# Patient Record
Sex: MALE | Race: WHITE | NOT HISPANIC OR LATINO | Employment: UNEMPLOYED | URBAN - METROPOLITAN AREA
[De-identification: names, ages, dates, MRNs, and addresses within clinical notes are randomized per-mention and may not be internally consistent; named-entity substitution may affect disease eponyms.]

---

## 2021-10-15 ENCOUNTER — HOSPITAL ENCOUNTER (EMERGENCY)
Facility: CLINIC | Age: 36
Discharge: HOME | End: 2021-10-15
Attending: EMERGENCY MEDICINE | Admitting: EMERGENCY MEDICINE

## 2021-10-15 VITALS
HEIGHT: 69 IN | SYSTOLIC BLOOD PRESSURE: 137 MMHG | WEIGHT: 194 LBS | TEMPERATURE: 98.9 F | HEART RATE: 77 BPM | BODY MASS INDEX: 28.73 KG/M2 | DIASTOLIC BLOOD PRESSURE: 96 MMHG | RESPIRATION RATE: 20 BRPM | OXYGEN SATURATION: 97 %

## 2021-10-15 DIAGNOSIS — R50.9 FEBRILE ILLNESS: ICD-10-CM

## 2021-10-15 PROCEDURE — 87631 RESP VIRUS 3-5 TARGETS: CPT | Performed by: EMERGENCY MEDICINE

## 2021-10-15 PROCEDURE — 84999 UNLISTED CHEMISTRY PROCEDURE: CPT | Performed by: EMERGENCY MEDICINE

## 2021-10-15 PROCEDURE — 99283 EMERGENCY DEPT VISIT LOW MDM: CPT | Performed by: EMERGENCY MEDICINE

## 2021-10-15 ASSESSMENT — MIFFLIN-ST. JEOR: SCORE: 1810.36

## 2021-10-15 NOTE — ED PROVIDER NOTES
"  History     Chief Complaint   Patient presents with     Generalized Body Aches     Fever     HPI  Marv Armendariz is a 36 year old male who presents with 1 day history of headache, body aches, abdominal pain, nausea, vomiting and diarrhea.  Had a temperature of 103 and had chills.  Denies chest pain, shortness of breath or cough.  No ear pain, runny nose or sore throat.  Was diagnosed with Covid 1 month ago and had 3 days of mild symptoms.  Concerned that he may have influenza.  Not received either Covid or influenza vaccines.  He was exposed through his children from school.  No treatment prior to arrival.  States he feels much better today and denies most of the symptoms he had experienced yesterday.  Patient states he has either Crohn's or colitis but has never had a formal diagnosis.  He had a normal colonoscopy recently.  Does not sound like he did biopsies.  He thinks he has inflammatory bowel disease as there is a strong family history and from his previous symptoms.  He thinks he is on the mild end of the spectrum for this problem.  No abdominal surgeries.  No history of diverticular disease.  No recent travel or antibiotic use.  No well water.    Allergies:  No Known Allergies    Problem List:    There are no problems to display for this patient.       Past Medical History:    No past medical history on file.    Past Surgical History:    No past surgical history on file.    Family History:    No family history on file.    Social History:  Marital Status:   [2]  Social History     Tobacco Use     Smoking status: Not on file   Substance Use Topics     Alcohol use: Not on file     Drug use: Not on file        Medications:    No current outpatient medications on file.        Review of Systems all other systems are reviewed and are negative.    Physical Exam   BP: (!) 137/96  Pulse: 95  Temp: 98.9  F (37.2  C)  Resp: 20  Height: 175.3 cm (5' 9\")  Weight: 88 kg (194 lb)  SpO2: 95 %      Physical " Exam General alert male does not toxic or ill.  HEENT shows ears to be clear bilaterally.  Eyes show no injection.  Nasal passages are boggy but patent.  Orally no lesions.  Neck is supple without meningismus.  Lungs are clear without adventitious sounds.  Normal cardiac auscultation.  Abdomen is soft and benign.  There is no organomegaly or masses.  No localizing tenderness.  No guarding or rebound.  No CVA tenderness.  No skin rashes are appreciated.    ED Course        Procedures              Critical Care time:  none               No results found for this or any previous visit (from the past 24 hour(s)).    Medications - No data to display  We not doing rapid testing for influenza at this point.  PCR test is ordered.  Assessments & Plan (with Medical Decision Making)   Marv Armendariz is a 36 year old male who presents with 1 day history of headache, body aches, abdominal pain, nausea, vomiting and diarrhea.  Had a temperature of 103 and had chills.  Denies chest pain, shortness of breath or cough.  No ear pain, runny nose or sore throat.  Was diagnosed with Covid 1 month ago and had 3 days of mild symptoms.  Concerned that he may have influenza.  Not received either Covid or influenza vaccines.  He was exposed through his children from school.  No treatment prior to arrival.  States he feels much better today and denies most of the symptoms he had experienced yesterday.  Patient states he has either Crohn's or colitis but has never had a formal diagnosis.  He had a normal colonoscopy recently.  Does not sound like he did biopsies.  He thinks he has inflammatory bowel disease as there is a strong family history and from his previous symptoms.  He thinks he is on the mild end of the spectrum for this problem.  No abdominal surgeries.  No history of diverticular disease.  No recent travel or antibiotic use.  No well water.  On exam no focal findings as above.  Is afebrile and vitally stable.  Is not hypoxic.   We are not doing rapid testing for influenza so PCR test is ordered.  He can get the results through [employer] or by contacting the lab number.  Reasons to return to emergency room were discussed.  I have reviewed the nursing notes.    I have reviewed the findings, diagnosis, plan and need for follow up with the patient.       New Prescriptions    No medications on file       Final diagnoses:   None       10/15/2021   [facility] EMERGENCY DEPT     Papa Linda MD  10/15/21 5350

## 2021-10-15 NOTE — DISCHARGE INSTRUCTIONS
Your exam today was reassuring.  The influenza testing is a send out test and may take up to 48 hours to get results.  You can get the results either through my chart or the contact information provided on the discharge instructions.

## 2021-10-15 NOTE — ED TRIAGE NOTES
"Patient presents with \"the flu\". He reports feeling achy all over, fever, nausea and vomiting. He reports getting \"the flu every October.\" He reports having + COVID a month ago. Felipa Sheppard RN   "

## 2021-10-17 ENCOUNTER — TELEPHONE (OUTPATIENT)
Dept: EMERGENCY MEDICINE | Facility: CLINIC | Age: 36
End: 2021-10-17

## 2021-10-17 NOTE — TELEPHONE ENCOUNTER
[facility] Emergency Department Lab result notification     Patient/parent Name  Marv    Reason for call  Patient requesting lab result    Lab Result  Mini Resp virus panel is still in process, see below  Current symptoms  9:15AM: patient states he is still not feeling well. Continues to have high fever's at night up to 104. Nauseated during the day. Better mid day after taking Tylenol. Patient states he is normally very healthy, has no chronic health conditions.   Recommendations/Instructions  Advised that the lab is still in process and that he would be called with any positive result.   The patient is comfortable with the information given and has no further questions.     Contact your PCP clinic or return to the Emergency department if your:    Symptoms worsen or other concerning symptom's.    PCP follow-up Questions asked: YES       Fior Ashraf RN  [facility] [place]  Emergency Dept Lab Result RN  # [phone]     Copy of Lab result   Laboratory Miscellaneous Order)  Order: [id number]  Status:  Final result   Visible to patient:  No (blocked)   0 Result Notes  Component 2 d ago Resulting Agency   See Scanned Result Specimen received. Reordered and sent to performing laboratory. Report to follow up on completion.   [facility]   Performing Laboratory [facility]  [facility]   Test Name Respiratory Virus Mini Panel by PCR  [facility]   Test Code [id number]  [facility]         Specimen Collected: 10/15/21 10:35 AM Last Resulted: 10/15/21  4:05 PM

## 2021-10-19 ENCOUNTER — TELEPHONE (OUTPATIENT)
Dept: EMERGENCY MEDICINE | Facility: CLINIC | Age: 36
End: 2021-10-19

## 2021-10-19 NOTE — TELEPHONE ENCOUNTER
[employer] [facility] Emergency Department Lab result notification     Patient/parent Name  Marv    Reason for call  Patient requesting lab result    Lab Result  Influenza pcr still in process per chart  Current symptoms  9:14 feeling same since ER visit 10/15/21  Recommendations/Instructions  Will call lab to inquire on turn around time on labs sent to [facility]  Will call patient back when results can be expected, voicemail is okay.  9:31 Spoke to Renae at [facility] lab and she did indicate that the outside lab is in another state  by [facility]  Will call [facility] for expected turn around time  [facility] states they do not have an order for this patient.  9:38 spoke to Sayra at [place] lab she will call [facility] to inquire.  10:02 Sayra states that the [facility] lab had needed to correct something on their end before the specimen could be processed,is now being processed.  10:05 Relayed to patient result should be coming and patient can check Mychart call ED lab results again or did relay that ED lab results team call on positives.  Patient stated understanding.  Contact your PCP clinic or return to the Emergency department if your:    Symptoms return.    Symptoms do not improved after 3 days on antibiotic.    Symptoms do not resolve after completing antibiotic.    Symptoms worsen or other concerning symptom's.        Natasha Navarro, SARITA  [facility] [employer] [place]  Emergency Dept Lab Result RN  Ph# [phone]

## 2021-10-20 LAB — MISCELLANEOUS TEST 1 (ARUP): NORMAL

## 2021-10-20 NOTE — RESULT ENCOUNTER NOTE
Final Influenza A and B and RSV PCR is NEGATIVE for Influenza A, Influenza B, and RSV.    No treatment or change in treatment per [facility] Respiratory Virus Panel or Influenza A/B antigen protocol.

## 2021-10-22 NOTE — TELEPHONE ENCOUNTER
[facility] result notification     Patient/parent Name  Marv    Reason for call  Patient requesting lab result via voicemail on ED results line    Lab Result  Negative for Influenza A and B negative RSV  Current symptoms  10:57 No return phone number given on message left with ED lab results  Number in file is for spouse only, no message left  Recommendations/Instructions  Will try calling patient again.  11:40 Left voicemail message requesting a call back to [facility] ED Lab Result RN at [phone].  RN is available every day between 9 a.m. and 5:30 p.m.  3:59 Relayed to patient negative Influenza A and B and RSV   Patient states he must have corona, feels easily fatigued. Attempted to discuss a follow up visit but patient disconnected or call was dropped.          Natasha Navarro, SARITA  [facility]  Emergency Dept Lab Result RN  Ph# [phone]

## 2021-11-21 ENCOUNTER — HEALTH MAINTENANCE LETTER (OUTPATIENT)
Age: 36
End: 2021-11-21

## 2021-12-21 LAB
Lab: NORMAL
PERFORMING LABORATORY: NORMAL
SPECIMEN STATUS: NORMAL
TEST NAME: NORMAL

## 2023-04-23 ENCOUNTER — HEALTH MAINTENANCE LETTER (OUTPATIENT)
Age: 38
End: 2023-04-23

## 2023-10-21 ENCOUNTER — APPOINTMENT (OUTPATIENT)
Dept: CT IMAGING | Facility: CLINIC | Age: 38
End: 2023-10-21
Attending: EMERGENCY MEDICINE
Payer: COMMERCIAL

## 2023-10-21 ENCOUNTER — TELEPHONE (OUTPATIENT)
Dept: EMERGENCY MEDICINE | Facility: CLINIC | Age: 38
End: 2023-10-21

## 2023-10-21 ENCOUNTER — HOSPITAL ENCOUNTER (EMERGENCY)
Facility: CLINIC | Age: 38
Discharge: HOME | End: 2023-10-21
Attending: EMERGENCY MEDICINE | Admitting: EMERGENCY MEDICINE
Payer: COMMERCIAL

## 2023-10-21 VITALS
SYSTOLIC BLOOD PRESSURE: 147 MMHG | OXYGEN SATURATION: 100 % | HEART RATE: 91 BPM | WEIGHT: 193 LBS | DIASTOLIC BLOOD PRESSURE: 77 MMHG | RESPIRATION RATE: 18 BRPM | TEMPERATURE: 97.6 F | BODY MASS INDEX: 28.5 KG/M2

## 2023-10-21 DIAGNOSIS — K76.9 LIVER LESION: ICD-10-CM

## 2023-10-21 DIAGNOSIS — N20.0 KIDNEY STONE: ICD-10-CM

## 2023-10-21 LAB
ALBUMIN SERPL BCG-MCNC: 4.8 G/DL (ref 3.5–5.2)
ALBUMIN UR-MCNC: 30 MG/DL
ALP SERPL-CCNC: 76 U/L (ref 40–129)
ALT SERPL W P-5'-P-CCNC: 39 U/L (ref 0–70)
AMORPH CRY #/AREA URNS HPF: ABNORMAL /HPF
ANION GAP SERPL CALCULATED.3IONS-SCNC: 20 MMOL/L (ref 7–15)
APPEARANCE UR: ABNORMAL
AST SERPL W P-5'-P-CCNC: 26 U/L (ref 0–45)
BASO+EOS+MONOS # BLD AUTO: ABNORMAL 10*3/UL
BASO+EOS+MONOS NFR BLD AUTO: ABNORMAL %
BASOPHILS # BLD AUTO: 0 10E3/UL (ref 0–0.2)
BASOPHILS NFR BLD AUTO: 0 %
BILIRUB SERPL-MCNC: 0.2 MG/DL
BILIRUB UR QL STRIP: NEGATIVE
BUN SERPL-MCNC: 17.5 MG/DL (ref 6–20)
CALCIUM SERPL-MCNC: 9.4 MG/DL (ref 8.6–10)
CHLORIDE SERPL-SCNC: 103 MMOL/L (ref 98–107)
COLOR UR AUTO: YELLOW
CREAT SERPL-MCNC: 0.89 MG/DL (ref 0.67–1.17)
DEPRECATED HCO3 PLAS-SCNC: 17 MMOL/L (ref 22–29)
EGFRCR SERPLBLD CKD-EPI 2021: >90 ML/MIN/1.73M2
EOSINOPHIL # BLD AUTO: 0 10E3/UL (ref 0–0.7)
EOSINOPHIL NFR BLD AUTO: 0 %
ERYTHROCYTE [DISTWIDTH] IN BLOOD BY AUTOMATED COUNT: 13 % (ref 10–15)
GLUCOSE SERPL-MCNC: 168 MG/DL (ref 70–99)
GLUCOSE UR STRIP-MCNC: NEGATIVE MG/DL
HCT VFR BLD AUTO: 46.9 % (ref 40–53)
HGB BLD-MCNC: 15.8 G/DL (ref 13.3–17.7)
HGB UR QL STRIP: ABNORMAL
HYALINE CASTS: 1 /LPF
IMM GRANULOCYTES # BLD: 0.1 10E3/UL
IMM GRANULOCYTES NFR BLD: 1 %
KETONES UR STRIP-MCNC: NEGATIVE MG/DL
LEUKOCYTE ESTERASE UR QL STRIP: NEGATIVE
LYMPHOCYTES # BLD AUTO: 1.3 10E3/UL (ref 0.8–5.3)
LYMPHOCYTES NFR BLD AUTO: 9 %
MCH RBC QN AUTO: 28.9 PG (ref 26.5–33)
MCHC RBC AUTO-ENTMCNC: 33.7 G/DL (ref 31.5–36.5)
MCV RBC AUTO: 86 FL (ref 78–100)
MONOCYTES # BLD AUTO: 0.6 10E3/UL (ref 0–1.3)
MONOCYTES NFR BLD AUTO: 4 %
MUCOUS THREADS #/AREA URNS LPF: PRESENT /LPF
NEUTROPHILS # BLD AUTO: 13.7 10E3/UL (ref 1.6–8.3)
NEUTROPHILS NFR BLD AUTO: 86 %
NITRATE UR QL: NEGATIVE
NRBC # BLD AUTO: 0 10E3/UL
NRBC BLD AUTO-RTO: 0 /100
PH UR STRIP: 5 [PH] (ref 5–7)
PLATELET # BLD AUTO: 287 10E3/UL (ref 150–450)
POTASSIUM SERPL-SCNC: 3.3 MMOL/L (ref 3.4–5.3)
PROT SERPL-MCNC: 7.6 G/DL (ref 6.4–8.3)
RBC # BLD AUTO: 5.47 10E6/UL (ref 4.4–5.9)
RBC URINE: 42 /HPF
SODIUM SERPL-SCNC: 140 MMOL/L (ref 135–145)
SP GR UR STRIP: 1.02 (ref 1–1.03)
UROBILINOGEN UR STRIP-MCNC: NORMAL MG/DL
WBC # BLD AUTO: 15.8 10E3/UL (ref 4–11)
WBC URINE: 3 /HPF

## 2023-10-21 PROCEDURE — 96375 TX/PRO/DX INJ NEW DRUG ADDON: CPT | Performed by: EMERGENCY MEDICINE

## 2023-10-21 PROCEDURE — 74176 CT ABD & PELVIS W/O CONTRAST: CPT

## 2023-10-21 PROCEDURE — 81001 URINALYSIS AUTO W/SCOPE: CPT | Performed by: EMERGENCY MEDICINE

## 2023-10-21 PROCEDURE — 36415 COLL VENOUS BLD VENIPUNCTURE: CPT | Performed by: EMERGENCY MEDICINE

## 2023-10-21 PROCEDURE — 258N000003 HC RX IP 258 OP 636: Performed by: EMERGENCY MEDICINE

## 2023-10-21 PROCEDURE — 80053 COMPREHEN METABOLIC PANEL: CPT | Performed by: EMERGENCY MEDICINE

## 2023-10-21 PROCEDURE — 99285 EMERGENCY DEPT VISIT HI MDM: CPT | Mod: 25 | Performed by: EMERGENCY MEDICINE

## 2023-10-21 PROCEDURE — 99284 EMERGENCY DEPT VISIT MOD MDM: CPT | Performed by: EMERGENCY MEDICINE

## 2023-10-21 PROCEDURE — 96361 HYDRATE IV INFUSION ADD-ON: CPT | Performed by: EMERGENCY MEDICINE

## 2023-10-21 PROCEDURE — 96374 THER/PROPH/DIAG INJ IV PUSH: CPT | Performed by: EMERGENCY MEDICINE

## 2023-10-21 PROCEDURE — 250N000011 HC RX IP 250 OP 636: Performed by: EMERGENCY MEDICINE

## 2023-10-21 PROCEDURE — 85025 COMPLETE CBC W/AUTO DIFF WBC: CPT | Performed by: EMERGENCY MEDICINE

## 2023-10-21 RX ORDER — TAMSULOSIN HYDROCHLORIDE 0.4 MG/1
0.4 CAPSULE ORAL DAILY
Qty: 10 CAPSULE | Refills: 0 | Status: SHIPPED | OUTPATIENT
Start: 2023-10-21 | End: 2023-10-31

## 2023-10-21 RX ORDER — HYDROMORPHONE HYDROCHLORIDE 1 MG/ML
0.5 INJECTION, SOLUTION INTRAMUSCULAR; INTRAVENOUS; SUBCUTANEOUS ONCE
Status: COMPLETED | OUTPATIENT
Start: 2023-10-21 | End: 2023-10-21

## 2023-10-21 RX ORDER — ONDANSETRON 4 MG/1
4 TABLET, ORALLY DISINTEGRATING ORAL EVERY 8 HOURS PRN
Qty: 10 TABLET | Refills: 0 | Status: SHIPPED | OUTPATIENT
Start: 2023-10-21

## 2023-10-21 RX ORDER — ONDANSETRON 2 MG/ML
4 INJECTION INTRAMUSCULAR; INTRAVENOUS ONCE
Status: COMPLETED | OUTPATIENT
Start: 2023-10-21 | End: 2023-10-21

## 2023-10-21 RX ORDER — KETOROLAC TROMETHAMINE 15 MG/ML
15 INJECTION, SOLUTION INTRAMUSCULAR; INTRAVENOUS ONCE
Status: COMPLETED | OUTPATIENT
Start: 2023-10-21 | End: 2023-10-21

## 2023-10-21 RX ORDER — OXYCODONE HYDROCHLORIDE 5 MG/1
5 TABLET ORAL EVERY 6 HOURS PRN
Qty: 6 TABLET | Refills: 0 | Status: SHIPPED | OUTPATIENT
Start: 2023-10-21 | End: 2024-04-07

## 2023-10-21 RX ADMIN — ONDANSETRON 4 MG: 2 INJECTION INTRAMUSCULAR; INTRAVENOUS at 10:32

## 2023-10-21 RX ADMIN — HYDROMORPHONE HYDROCHLORIDE 0.5 MG: 1 INJECTION, SOLUTION INTRAMUSCULAR; INTRAVENOUS; SUBCUTANEOUS at 11:55

## 2023-10-21 RX ADMIN — KETOROLAC TROMETHAMINE 15 MG: 15 INJECTION INTRAMUSCULAR; INTRAVENOUS at 10:30

## 2023-10-21 RX ADMIN — SODIUM CHLORIDE 1000 ML: 9 INJECTION, SOLUTION INTRAVENOUS at 10:29

## 2023-10-21 ASSESSMENT — ACTIVITIES OF DAILY LIVING (ADL): ADLS_ACUITY_SCORE: 35

## 2023-10-21 NOTE — ED PROVIDER NOTES
History     chief complaint  HPI  Patient is a 38-year-old gentleman without a significant past medical history presenting with several days of right lower quadrant pain and right back pain.  Radiates around.  Has nausea.  No fevers, chills, rhinorrhea, sore throat, cough.  Also having constipation.  He feels like that is the underlying cause.  No hematuria or dysuria.    Review of Systems:  All organ systems below were reviewed and are negative unless indicated in the HPI.    Constitutional  Eyes  ENT  Respiratory  Cardiovascular  Gastrointestinal  Genitourinary  Musculoskeletal  Skin  Neuro    Allergies:  No Known Allergies    Problem List:    There are no problems to display for this patient.       Past Medical History:    History reviewed. No pertinent past medical history.    Past Surgical History:    History reviewed. No pertinent surgical history.    Family History:    History reviewed. No pertinent family history.    Medications:    ondansetron (ZOFRAN ODT) 4 MG ODT tab  oxyCODONE (ROXICODONE) 5 MG tablet  tamsulosin (FLOMAX) 0.4 MG capsule          Physical Exam   BP: (!) 163/92  Pulse: 54  Temp: 97.6  F (36.4  C)  Resp: 18  Weight: 87.5 kg (193 lb)  SpO2: 100 %    Gen: Vital signs reviewed  Eyes: Sclera white, pupils round  ENT: External ears and nares normal  Card: Regular rate and rhythm  Resp: No respiratory distress. Lungs clear to auscultation bilaterally  Abd: Soft, non-distended, non-tender  Back: Right CVA tenderness.  Extremities: Symmetric distal pulses.  Skin: No rashes  Neuro: Alert, speech normal.     ED Course        Procedures         Results for orders placed or performed during the hospital encounter of 10/21/23 (from the past 24 hour(s))   CBC with platelets differential    Narrative    The following orders were created for panel order CBC with platelets differential.  Procedure                               Abnormality         Status                     ---------                                -----------         ------                     CBC with platelets and d...[[id number]]  Abnormal            Final result                 Please view results for these tests on the individual orders.   Comprehensive metabolic panel   Result Value Ref Range    Sodium 140 135 - 145 mmol/L    Potassium 3.3 (L) 3.4 - 5.3 mmol/L    Carbon Dioxide (CO2) 17 (L) 22 - 29 mmol/L    Anion Gap 20 (H) 7 - 15 mmol/L    Urea Nitrogen 17.5 6.0 - 20.0 mg/dL    Creatinine 0.89 0.67 - 1.17 mg/dL    GFR Estimate >90 >60 mL/min/1.73m2    Calcium 9.4 8.6 - 10.0 mg/dL    Chloride 103 98 - 107 mmol/L    Glucose 168 (H) 70 - 99 mg/dL    Alkaline Phosphatase 76 40 - 129 U/L    AST 26 0 - 45 U/L    ALT 39 0 - 70 U/L    Protein Total 7.6 6.4 - 8.3 g/dL    Albumin 4.8 3.5 - 5.2 g/dL    Bilirubin Total 0.2 <=1.2 mg/dL   CBC with platelets and differential   Result Value Ref Range    WBC Count 15.8 (H) 4.0 - 11.0 10e3/uL    RBC Count 5.47 4.40 - 5.90 10e6/uL    Hemoglobin 15.8 13.3 - 17.7 g/dL    Hematocrit 46.9 40.0 - 53.0 %    MCV 86 78 - 100 fL    MCH 28.9 26.5 - 33.0 pg    MCHC 33.7 31.5 - 36.5 g/dL    RDW 13.0 10.0 - 15.0 %    Platelet Count 287 150 - 450 10e3/uL    % Neutrophils 86 %    % Lymphocytes 9 %    % Monocytes 4 %    Mids % (Monos, Eos, Basos)      % Eosinophils 0 %    % Basophils 0 %    % Immature Granulocytes 1 %    NRBCs per 100 WBC 0 <1 /100    Absolute Neutrophils 13.7 (H) 1.6 - 8.3 10e3/uL    Absolute Lymphocytes 1.3 0.8 - 5.3 10e3/uL    Absolute Monocytes 0.6 0.0 - 1.3 10e3/uL    Mids Abs (Monos, Eos, Basos)      Absolute Eosinophils 0.0 0.0 - 0.7 10e3/uL    Absolute Basophils 0.0 0.0 - 0.2 10e3/uL    Absolute Immature Granulocytes 0.1 <=0.4 10e3/uL    Absolute NRBCs 0.0 10e3/uL   CT Abdomen Pelvis w/o Contrast    Narrative    EXAM: CT ABDOMEN PELVIS W/O CONTRAST  LOCATION: [facility]  DATE: 10/21/2023    INDICATION: colicky right flank pain  COMPARISON: None.  TECHNIQUE: CT scan of the  abdomen and pelvis was performed without IV contrast. Multiplanar reformats were obtained. Dose reduction techniques were used.  CONTRAST: None.    FINDINGS:   LOWER CHEST: Normal.    HEPATOBILIARY: A 2.5 cm low-attenuation lesion inferior right hepatic lobe and a 1.7 cm low-attenuation in lateral segment left hepatic lobe are of a density greater than simple cysts and could represent hemangiomas. Liver, gallbladder and bile ducts   otherwise normal.    PANCREAS: Normal.    SPLEEN: Normal.    ADRENAL GLANDS: Normal.    KIDNEYS/BLADDER: Mild right pyelocaliectasis and ureterectasis secondary to 4 x 3 x 2 mm right ureterovesical junction stone. Kidneys, ureters and bladder otherwise normal.    BOWEL: Normal appendix. Mild colonic diverticulosis with no diverticulitis.    LYMPH NODES: Normal.    VASCULATURE: Unremarkable.    PELVIC ORGANS: Normal.    MUSCULOSKELETAL: Normal.      Impression    IMPRESSION:   1.  A 4 x 3 x 2 mm right ureterovesical junction stone causing mild proximal obstruction.  2.  Kidneys, ureters and bladder otherwise normal.  3.  Two probably benign liver lesions. Recommend nonurgent ultrasound to confirm.   4.  Mild colonic diverticulosis with no diverticulitis     UA with Microscopic reflex to Culture    Specimen: Urine, Midstream   Result Value Ref Range    Color Urine Yellow Colorless, Straw, Light Yellow, Yellow    Appearance Urine Slightly Cloudy (A) Clear    Glucose Urine Negative Negative mg/dL    Bilirubin Urine Negative Negative    Ketones Urine Negative Negative mg/dL    Specific Gravity Urine 1.024 1.003 - 1.035    Blood Urine Large (A) Negative    pH Urine 5.0 5.0 - 7.0    Protein Albumin Urine 30 (A) Negative mg/dL    Urobilinogen Urine Normal Normal, 2.0 mg/dL    Nitrite Urine Negative Negative    Leukocyte Esterase Urine Negative Negative    Mucus Urine Present (A) None Seen /LPF    Amorphous Crystals Urine Few (A) None Seen /HPF    RBC Urine 42 (H) <=2 /HPF    WBC Urine 3 <=5 /HPF     Hyaline Casts Urine 1 <=2 /LPF    Narrative    Urine Culture not indicated       Medications   ketorolac (TORADOL) injection 15 mg (15 mg Intravenous $Given 10/21/23 1030)   ondansetron (ZOFRAN) injection 4 mg (4 mg Intravenous $Given 10/21/23 1032)   sodium chloride 0.9% BOLUS 1,000 mL (0 mLs Intravenous Stopped 10/21/23 1141)   HYDROmorphone (PF) (DILAUDID) injection 0.5 mg (0.5 mg Intravenous $Given 10/21/23 1155)         Consultations:  None    Social Determinants of Health:      Assessments & Plan (with Medical Decision Making)       I have reviewed the nursing notes.    I have reviewed the findings, diagnosis, plan and need for follow up with the patient.      Medical Decision Making  On arrival, patient is hypertensive.  Differential his presentation includes appendicitis, pyelonephritis, ureteral lithiasis.  Lower suspicion for constipation as the underlying cause given his degree of pain.  CT scan, urine, labs ordered.  Given the above medications for symptomatic relief.  CT scan demonstrates a ureteral stone as above.  Liver lesions noted with nonemergent ultrasound recommended as an outpatient.  Placed this in his chart.  He does not have a UTI and his leukocytosis is likely stress demargination.  With symptoms controlled I do think he is a good candidate for outpatient management.  Given urology follow-up.  Given outpatient medications for symptomatic relief and he was discharged home in stable condition with appropriate return precautions.    Final diagnoses:   Kidney stone   Liver lesion         Dakota Tadeo M.D.   [facility] Emergency Department     Dakota Tadeo MD  10/21/23 6897

## 2023-10-21 NOTE — DISCHARGE INSTRUCTIONS
You have a kidney stone that is about 4 mm.  It is very likely to pass on its own.    For your pain, take Tylenol 1000 mg every 8 hours as needed.  You can also take 400 mg of ibuprofen every 6 hours as needed for pain.  If this is not helping take 5 mg of oxycodone every 6 hours as needed for pain.  You can use Zofran 4 mg every 6 hours for nausea.  The Flomax is to be taken once per day to help pass this quicker.    The urology team should reach out to you to help arrange follow-up.    Return here if you develop a fever or uncontrollable pain despite taking the medications as directed.    You also have 2 spots in your liver that are likely benign but you should receive an ultrasound by your primary care doctor to confirm this.

## 2023-10-21 NOTE — TELEPHONE ENCOUNTER
[facility] Emergency Department Lab result notification     Reason for call  Patient requesting CT scan information    Recommendations/Instructions  Patient wanting to know how far along his kidney stone is and how long until he passes the stone. ED note does not specifically state this. Urology referral has been placed so advised patient ask them this question for a more specific answer.    Charles Duffy RN  [facility]  Emergency Dept Lab Result RN  Ph# [phone]

## 2023-10-23 ENCOUNTER — NURSE TRIAGE (OUTPATIENT)
Dept: NURSING | Facility: CLINIC | Age: 38
End: 2023-10-23
Payer: COMMERCIAL

## 2023-10-25 ENCOUNTER — VIRTUAL VISIT (OUTPATIENT)
Dept: UROLOGY | Facility: CLINIC | Age: 38
End: 2023-10-25
Payer: COMMERCIAL

## 2023-10-25 DIAGNOSIS — Z87.442 HISTORY OF NEPHROLITHIASIS: Primary | ICD-10-CM

## 2023-10-25 DIAGNOSIS — N20.0 KIDNEY STONE: ICD-10-CM

## 2023-10-25 PROCEDURE — 99203 OFFICE O/P NEW LOW 30 MIN: CPT | Mod: VID | Performed by: NURSE PRACTITIONER

## 2023-10-25 ASSESSMENT — PAIN SCALES - GENERAL: PAINLEVEL: MILD PAIN (3)

## 2023-10-25 NOTE — NURSING NOTE
Is the patient currently in the state of [place]? YES    Visit mode:VIDEO    If the visit is dropped, the patient can be reconnected by: VIDEO VISIT: Text to cell phone:   Telephone Information:   Mobile [phone]       Will anyone else be joining the visit? NO  (If patient encounters technical issues they should call [phone]    How would you like to obtain your AVS? MyChart    Are changes needed to the allergy or medication list? No    Reason for visit: Consult    Raquel HERRERA

## 2023-10-25 NOTE — PROGRESS NOTES
Urology Video Office Visit    Video-Visit Details    Type of service:  Video Visit    Video Start Time: 0951    Video End Time: 1010    Originating Location (pt. Location): Home    Distant Location (provider location):  On-site     Platform used for Video Visit: Koru           Assessment and Plan:     Assessment: 38 year old male with history of nephrolithiasis    Plan:  -Reviewed CT scan with patient. Noted right UVJ stone which has likely passed at this time. Recommend to bring stone in for analysis. Pt amenable to plan of care.   -The patient and I discussed the diagnosis and natural history of urolithiasis. Discussed option of 24 hour urine study for further evaluation for risks of stones. Pt deferred at this time.   -We discussed some general measures to prevent recurrent kidney stones.  These include fluid intake to achieve 2.5 liters of urine per day, decreased salt intake, normal calcium intake, lowering animal protein intake, avoiding high amounts of oxalate containing foods, and increased citrate intake with orange juice/lemonade.  -RTC in 1 year with CT scan    Annabella Whitney CNP  Department of Urology  October 25, 2023    I spent a total of 35 minutes spent on the date of the encounter doing chart review, history and exam, documentation, and further activities as noted above.          Chief Complaint:   Right UVJ Stone         History of Present Illness:    Marv Armendariz is a pleasant 38 year old male who presents with concerns of a right UVJ stone.     Mr. Armendariz was in the ER on 10/21/23 with RLQ pain and nausea.    CT scan performed on 10/21/23 (images personally reviewed) revealed a right UVJ stone.     He notes he was able to pass his stone. He denies any ongoing flank pain, fevers, chills, nausea, vomiting, hematuria, or dysuria.     This was his first stone episode. Family history of stones in maternal uncle.            Past Medical History:   None         Past Surgical  History:   No past surgical history on file.         Medications     Current Outpatient Medications   Medication    ondansetron (ZOFRAN ODT) 4 MG ODT tab    oxyCODONE (ROXICODONE) 5 MG tablet    tamsulosin (FLOMAX) 0.4 MG capsule     No current facility-administered medications for this visit.            Family History:   No family history on file.         Social History:     Social History     Socioeconomic History    Marital status:      Spouse name: Not on file    Number of children: Not on file    Years of education: Not on file    Highest education level: Not on file   Occupational History    Not on file   Tobacco Use    Smoking status: Never    Smokeless tobacco: Never   Substance and Sexual Activity    Alcohol use: Not Currently    Drug use: Never    Sexual activity: Not on file   Other Topics Concern    Not on file   Social History Narrative    Not on file     Social Determinants of Health     Financial Resource Strain: Not on file   Food Insecurity: Not on file   Transportation Needs: Not on file   Physical Activity: Not on file   Stress: Not on file   Social Connections: Not on file   Interpersonal Safety: Not on file   Housing Stability: Not on file            Allergies:   Patient has no known allergies.         Review of Systems:  From intake questionnaire   Negative 14 system review except as noted on HPI, nurse's note.         Physical Exam:   General Appearance: Well groomed, hygenic  Eyes: No redness, discharge  Respiratory: No cough, no respiratory distress or labored breathing  Musculoskeletal: Grossly normal, full range of motion in upper extremities, no gross deficits  Skin: No discoloration or apparent rashes  Neurologic - No tremors  Psychiatric - Alert and oriented  The rest of a comprehensive physical examination is deferred due to video visit restrictions        Labs:    I personally reviewed all applicable laboratory data and went over findings with patient  Significant for:    CBC  RESULTS:  Recent Labs   Lab Test 10/21/23  1024   WBC 15.8*   HGB 15.8           BMP RESULTS:  Recent Labs   Lab Test 10/21/23  1024      POTASSIUM 3.3*   CHLORIDE 103   CO2 17*   ANIONGAP 20*   *   BUN 17.5   CR 0.89   GFRESTIMATED >90   BEATRICE 9.4       UA RESULTS:   Recent Labs   Lab Test 10/21/23  1141   SG 1.024   URINEPH 5.0   NITRITE Negative   RBCU 42*   WBCU 3       CALCIUM RESULTS  Lab Results   Component Value Date    BEATRICE 9.4 10/21/2023           Imaging:    I personally reviewed all applicable imaging and went over the below findings with patient.    Results for orders placed or performed during the hospital encounter of 10/21/23   CT Abdomen Pelvis w/o Contrast    Narrative    EXAM: CT ABDOMEN PELVIS W/O CONTRAST  LOCATION: [facility]  DATE: 10/21/2023    INDICATION: colicky right flank pain  COMPARISON: None.  TECHNIQUE: CT scan of the abdomen and pelvis was performed without IV contrast. Multiplanar reformats were obtained. Dose reduction techniques were used.  CONTRAST: None.    FINDINGS:   LOWER CHEST: Normal.    HEPATOBILIARY: A 2.5 cm low-attenuation lesion inferior right hepatic lobe and a 1.7 cm low-attenuation in lateral segment left hepatic lobe are of a density greater than simple cysts and could represent hemangiomas. Liver, gallbladder and bile ducts   otherwise normal.    PANCREAS: Normal.    SPLEEN: Normal.    ADRENAL GLANDS: Normal.    KIDNEYS/BLADDER: Mild right pyelocaliectasis and ureterectasis secondary to 4 x 3 x 2 mm right ureterovesical junction stone. Kidneys, ureters and bladder otherwise normal.    BOWEL: Normal appendix. Mild colonic diverticulosis with no diverticulitis.    LYMPH NODES: Normal.    VASCULATURE: Unremarkable.    PELVIC ORGANS: Normal.    MUSCULOSKELETAL: Normal.      Impression    IMPRESSION:   1.  A 4 x 3 x 2 mm right ureterovesical junction stone causing mild proximal obstruction.  2.  Kidneys, ureters and  bladder otherwise normal.  3.  Two probably benign liver lesions. Recommend nonurgent ultrasound to confirm.   4.  Mild colonic diverticulosis with no diverticulitis

## 2023-10-25 NOTE — PATIENT INSTRUCTIONS
UROLOGY CLINIC VISIT PATIENT INSTRUCTIONS    -Contact patient records to request CT scan images on CD.     If you have any issues, questions or concerns in the meantime, do not hesitate to contact us at [facility] at [phone] or via [employer].     Annabella Whitney CNP  Department of Urology     DIET & LIFESTYLE CHANGES FOR PATIENTS WITH KIDNEY STONES    If you've had a kidney stone, you are likely to form another. Risk of recurrence is 15% at 1 year, 35% to 40% at 2 years, and 50% at 10 years. Therefore, prevention is very important.  Because of the chemical analysis of both your stone & urine, some changes in your diet & lifestyle are recommended. These recommendations have shown to be effective.    CALCIUM STONES (Oxalate and Phosphate)    Fluid intake is the most important prevention measure to help prevent stones. Fluid intake should be at least 2.5 liters per day. With goal of urine output of >2.5L per day.   Increasing liquids that have citric acid may help such as low calorie orange juice, lemonade (Crystal Light Lemonade), or adding a citrus to your water.  You can begin adding lemon juice or fresh pawan to your water daily.  Lemon juice increases the citrate in your urine, and helps decrease the formation of stone and even breakdown certain types of stones. Add a cap full/teaspoon of pure lemon juice to each glass.   Try to limit sugar, especially if you have diabetes.    Helpful Fluid Measurements:  1 liter = 34oz  1 quart = 32 oz  24 pack water: Each bottle 16.9 oz     Limit your consumption of OXALATE-rich foods including:  All nuts and nut products including peanuts, almonds, pecans, peanut butter, almond milk  Rhubarb  Chocolate  Soybeans and soy products   Spinach  Wheat Germ  Beets  [email or web address]    Trying a DASH (Dietary Approaches to Stop Hypertension) diet which is eating more fruits and vegetables, limiting salt intake, moderate in low-fat diary products, and  low in animal protein.   Try to decrease salt intake to <2000 mg of sodium daily.    Reduce animal protein (meat) intake to no more than 6-8 ounces or less than 50 grams per day.     Calcium rich foods are encouraged, but no more than 1000 - 1200 mg per day.   Calcium rich foods include:   Diary (cheese, milk, and yogurt)  Enriched cereals    Limit Vitamin C intake to < 1000 mg daily.    Consultation with a dietician may be helpful as well.  Please let our staff know if you are interested in this helpful option so a consult may be placed for you.

## 2023-10-27 ENCOUNTER — TELEPHONE (OUTPATIENT)
Dept: UROLOGY | Facility: CLINIC | Age: 38
End: 2023-10-27
Payer: COMMERCIAL

## 2023-10-27 NOTE — TELEPHONE ENCOUNTER
Attempt to reach patient in response to central message.  Unable to leave message as voicemail is not set up.

## 2023-10-27 NOTE — TELEPHONE ENCOUNTER
[facility] Call Center    Phone Message    May a detailed message be left on voicemail: yes     Reason for Call: Other: Pt calling to advise Annabella Whitney that he recently passed a stone and wondering how long he should wait to have sex. He is also feeling much better.Please advise pt. Thank you.     Action Taken: Message routed to:  Other: URO    Travel Screening: Not Applicable

## 2024-04-07 ENCOUNTER — APPOINTMENT (OUTPATIENT)
Dept: CT IMAGING | Facility: CLINIC | Age: 39
End: 2024-04-07
Attending: FAMILY MEDICINE
Payer: COMMERCIAL

## 2024-04-07 ENCOUNTER — APPOINTMENT (OUTPATIENT)
Dept: GENERAL RADIOLOGY | Facility: CLINIC | Age: 39
End: 2024-04-07
Attending: FAMILY MEDICINE
Payer: COMMERCIAL

## 2024-04-07 ENCOUNTER — HOSPITAL ENCOUNTER (EMERGENCY)
Facility: CLINIC | Age: 39
Discharge: HOME | End: 2024-04-07
Attending: FAMILY MEDICINE | Admitting: FAMILY MEDICINE
Payer: COMMERCIAL

## 2024-04-07 VITALS
BODY MASS INDEX: 29.09 KG/M2 | TEMPERATURE: 98.1 F | SYSTOLIC BLOOD PRESSURE: 124 MMHG | DIASTOLIC BLOOD PRESSURE: 83 MMHG | WEIGHT: 196.4 LBS | HEIGHT: 69 IN | RESPIRATION RATE: 18 BRPM | OXYGEN SATURATION: 98 % | HEART RATE: 101 BPM

## 2024-04-07 DIAGNOSIS — K76.9 LIVER LESION: ICD-10-CM

## 2024-04-07 DIAGNOSIS — S82.831A CLOSED FRACTURE OF DISTAL END OF RIGHT FIBULA, UNSPECIFIED FRACTURE MORPHOLOGY, INITIAL ENCOUNTER: ICD-10-CM
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
## 2024-04-07 DIAGNOSIS — S09.90XA CLOSED HEAD INJURY, INITIAL ENCOUNTER: ICD-10-CM

## 2024-04-07 DIAGNOSIS — F10.929 ALCOHOLIC INTOXICATION WITH COMPLICATION (H): ICD-10-CM

## 2024-04-07 LAB
ALCOHOL BREATH TEST: 0.1 (ref 0–0.01)
RADIOLOGIST FLAGS: NORMAL

## 2024-04-07 PROCEDURE — 76705 ECHO EXAM OF ABDOMEN: CPT | Mod: 26 | Performed by: FAMILY MEDICINE

## 2024-04-07 PROCEDURE — 76604 US EXAM CHEST: CPT | Performed by: FAMILY MEDICINE

## 2024-04-07 PROCEDURE — 76705 ECHO EXAM OF ABDOMEN: CPT | Performed by: FAMILY MEDICINE

## 2024-04-07 PROCEDURE — 73610 X-RAY EXAM OF ANKLE: CPT | Mod: RT

## 2024-04-07 PROCEDURE — 76604 US EXAM CHEST: CPT | Mod: 26 | Performed by: FAMILY MEDICINE

## 2024-04-07 PROCEDURE — 27786 TREATMENT OF ANKLE FRACTURE: CPT | Mod: 54 | Performed by: FAMILY MEDICINE

## 2024-04-07 PROCEDURE — 71260 CT THORAX DX C+: CPT

## 2024-04-07 PROCEDURE — 250N000011 HC RX IP 250 OP 636: Performed by: FAMILY MEDICINE

## 2024-04-07 PROCEDURE — 70450 CT HEAD/BRAIN W/O DYE: CPT

## 2024-04-07 PROCEDURE — 99285 EMERGENCY DEPT VISIT HI MDM: CPT | Mod: 57 | Performed by: FAMILY MEDICINE

## 2024-04-07 PROCEDURE — 82075 ASSAY OF BREATH ETHANOL: CPT | Performed by: FAMILY MEDICINE

## 2024-04-07 PROCEDURE — 27786 TREATMENT OF ANKLE FRACTURE: CPT | Mod: 55 | Performed by: PODIATRIST

## 2024-04-07 PROCEDURE — 27786 TREATMENT OF ANKLE FRACTURE: CPT | Mod: RT | Performed by: FAMILY MEDICINE

## 2024-04-07 PROCEDURE — 99285 EMERGENCY DEPT VISIT HI MDM: CPT | Mod: 25 | Performed by: FAMILY MEDICINE

## 2024-04-07 PROCEDURE — 93308 TTE F-UP OR LMTD: CPT | Performed by: FAMILY MEDICINE

## 2024-04-07 PROCEDURE — 250N000009 HC RX 250: Performed by: FAMILY MEDICINE

## 2024-04-07 PROCEDURE — 72125 CT NECK SPINE W/O DYE: CPT

## 2024-04-07 PROCEDURE — 73562 X-RAY EXAM OF KNEE 3: CPT | Mod: RT

## 2024-04-07 PROCEDURE — 93308 TTE F-UP OR LMTD: CPT | Mod: 26 | Performed by: FAMILY MEDICINE

## 2024-04-07 RX ORDER — IOPAMIDOL 755 MG/ML
500 INJECTION, SOLUTION INTRAVASCULAR ONCE
Status: COMPLETED | OUTPATIENT
Start: 2024-04-07 | End: 2024-04-07

## 2024-04-07 RX ORDER — OXYCODONE HYDROCHLORIDE 5 MG/1
5 TABLET ORAL EVERY 8 HOURS PRN
Qty: 8 TABLET | Refills: 0 | Status: SHIPPED | OUTPATIENT
Start: 2024-04-07 | End: 2024-04-11

## 2024-04-07 RX ADMIN — SODIUM CHLORIDE 60 ML: 9 INJECTION, SOLUTION INTRAVENOUS at 08:37

## 2024-04-07 RX ADMIN — IOPAMIDOL 95 ML: 755 INJECTION, SOLUTION INTRAVENOUS at 08:37

## 2024-04-07 ASSESSMENT — ACTIVITIES OF DAILY LIVING (ADL)
ADLS_ACUITY_SCORE: 35

## 2024-04-07 ASSESSMENT — COLUMBIA-SUICIDE SEVERITY RATING SCALE - C-SSRS
6. HAVE YOU EVER DONE ANYTHING, STARTED TO DO ANYTHING, OR PREPARED TO DO ANYTHING TO END YOUR LIFE?: NO
2. HAVE YOU ACTUALLY HAD ANY THOUGHTS OF KILLING YOURSELF IN THE PAST MONTH?: NO
1. IN THE PAST MONTH, HAVE YOU WISHED YOU WERE DEAD OR WISHED YOU COULD GO TO SLEEP AND NOT WAKE UP?: NO

## 2024-04-07 NOTE — ED TRIAGE NOTES
Pt presents with concerns right ankle pain.  Pt fell in a ditch complaining of right ankle and right hip pain.  Pt had been drinking prior to the fall.  Incident occurred at 0300.     Triage Assessment (Adult)       Row Name 04/07/24 0752          Triage Assessment    Airway WDL WDL        Respiratory WDL    Respiratory WDL WDL        Skin Circulation/Temperature WDL    Skin Circulation/Temperature WDL WDL        Cardiac WDL    Cardiac WDL WDL        Peripheral/Neurovascular WDL    Peripheral Neurovascular WDL WDL
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
## 2024-04-07 NOTE — DISCHARGE INSTRUCTIONS
Fortunately we did not find any serious injuries on your exam, x-rays and scans.  However, you have a nondisplaced fracture of the right distal fibula.  This will be treated with a nonweightbearing splint and crutches.  You may also have a mild concussion.  Avoid activities that could result in another head injury.  Please see the attached handouts.  Elevate the leg is much as possible.  Do not attempt to put any weight on the right leg.  We will try to get an appointment for you with Dr. Delgadillo within the next week.  Take Tylenol and ibuprofen as needed for mild to moderate pain.  Reserve oxycodone for severe pain.  Return to the emergency department at any time if you develop new or worsening symptoms.  We found incidental hemangiomas of the liver, and this may need to be followed up with an MRI scan at a later date.  Please see your primary care provider for this follow-up.

## 2024-04-11 ENCOUNTER — OFFICE VISIT (OUTPATIENT)
Dept: PODIATRY | Facility: CLINIC | Age: 39
End: 2024-04-11
Payer: COMMERCIAL

## 2024-04-11 VITALS
WEIGHT: 196 LBS | TEMPERATURE: 97.6 F | HEIGHT: 69 IN | BODY MASS INDEX: 29.03 KG/M2 | DIASTOLIC BLOOD PRESSURE: 78 MMHG | SYSTOLIC BLOOD PRESSURE: 118 MMHG

## 2024-04-11 DIAGNOSIS — S82.831A CLOSED FRACTURE OF DISTAL END OF RIGHT FIBULA, UNSPECIFIED FRACTURE MORPHOLOGY, INITIAL ENCOUNTER: ICD-10-CM

## 2024-04-11 PROCEDURE — 99203 OFFICE O/P NEW LOW 30 MIN: CPT | Mod: 57 | Performed by: PODIATRIST

## 2024-04-11 ASSESSMENT — PAIN SCALES - GENERAL: PAINLEVEL: SEVERE PAIN (6)
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
## 2024-04-11 NOTE — PROGRESS NOTES
HPI:  Marv Armendariz is a 39 year old male who is seen in consultation at the request of ED DEPT - Malik Conteh MD    Pt presents for eval of:   (Onset, Location, L/R, Character, Treatments, Injury if yes)    XR Right ankle 4/7/2024     Onset 4/7/2024, while walking [place] the [address] middle of night, slipped on grass and fell down into drainage ditch.  He was unable to get out of the drainage ditch because it was too deep and slippery.  Unable to ambulate.  Phone got wet and was unable to make calls but kept dialing 911 until the police pinged his phone and found his location.      Presents with anterior Right ankle pain, NWB w/splint and crutches. Accompanied by his wife.  Constant swelling, bruising, dull ache. Intermittent burning pain 6-9/10, with certain ROM or if bumped.    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    ROS:  10 point ROS neg other than the symptoms noted above in the HPI.    There is no problem list on file for this patient.      PAST MEDICAL HISTORY: History reviewed. No pertinent past medical history.     PAST SURGICAL HISTORY: History reviewed. No pertinent surgical history.     MEDICATIONS:   Current Outpatient Medications:     Acetaminophen (TYLENOL PO), , Disp: , Rfl:     IBUPROFEN PO, , Disp: , Rfl:     oxyCODONE (ROXICODONE) 5 MG tablet, Take 1 tablet (5 mg) by mouth every 8 hours as needed for pain, Disp: 8 tablet, Rfl: 0    ondansetron (ZOFRAN ODT) 4 MG ODT tab, Take 1 tablet (4 mg) by mouth every 8 hours as needed for nausea (Patient not taking: Reported on 4/11/2024), Disp: 10 tablet, Rfl: 0     ALLERGIES:  No Known Allergies     SOCIAL HISTORY:   Social History     Socioeconomic History    Marital status:      Spouse name: Not on file    Number of children: Not on file    Years of education: Not on file    Highest education level: Not on file   Occupational History    Not on file   Tobacco Use     "Smoking status: Never    Smokeless tobacco: Never   Substance and Sexual Activity    Alcohol use: Not Currently    Drug use: Never    Sexual activity: Not on file   Other Topics Concern    Not on file   Social History Narrative    Not on file     Social Determinants of Health     Financial Resource Strain: Not on file   Food Insecurity: Not on file   Transportation Needs: Not on file   Physical Activity: Not on file   Stress: Not on file   Social Connections: Not on file   Interpersonal Safety: Not on file   Housing Stability: Not on file        FAMILY HISTORY: History reviewed. No pertinent family history.     EXAM:Vitals: /78 (BP Location: Left arm, Patient Position: Sitting, Cuff Size: Adult Large)   Temp 97.6  F (36.4  C) (Temporal)   Ht 1.753 m (5' 9\")   Wt 88.9 kg (196 lb)   BMI 28.94 kg/m    BMI= Body mass index is 28.94 kg/m .    General appearance: Patient is alert and fully cooperative with history & exam.  No sign of distress is noted during the visit.     Psychiatric: Affect is pleasant & appropriate.  Patient appears motivated to improve health.     Respiratory: Breathing is regular & unlabored while sitting.     HEENT: Hearing is intact to spoken word.  Speech is clear.  No gross evidence of visual impairment that would impact ambulation.     Vascular: DP & PT pulses are intact & regular bilaterally.  No significant edema or varicosities noted.  CFT and skin temperature is normal to both lower extremities.     Neurologic: Lower extremity sensation is intact to light touch.  No evidence of weakness or contracture in the lower extremities.  No evidence of neuropathy.    Dermatologic: Skin is intact to both lower extremities with adequate texture, turgor and tone about the integument.  No paronychia or evidence of soft tissue infection is noted.     Musculoskeletal: Patient is ambulatory with posterior splint and crutches.  Pain and edema circumferentially around the right ankle.  Ecchymosis " noted as well.  Edema is quite tight.    Radiographs: 3 views of the right ankle demonstrate oblique fracture of the distal fibula but it is quite short.  No displacement is noted and best visualized on the lateral.  Minimal to no shortening is noted.     ASSESSMENT:       ICD-10-CM    1. Closed fracture of distal end of right fibula  S82.831A Orthopedic  Referral     XR Ankle Right G/E 3 Views     Ankle/Foot Bracing Supplies Order Walking Boot (medium); Right; Pneumatic; Tall     Rolling Knee Walker Order           PLAN:  Reviewed [facility] in [facility].      4/11/2024   Minimal to no shortening and no lateral displacement therefore discussed options of ORIF versus immobilization at this time he would like to continue with immobilization.  Compression dressing was applied and he was placed in the fracture boot and will remain nonweightbearing.  Follow-up in 1-2 weeks to repeat imaging ordered future today.  We will confirm no change in alignment is noted and then likely begin short periods of protected weightbearing in a fracture boot.  Keep the fracture boot in place 24/7 even sleep and rest..  Keep the compression in place during the daytime.      Hank Delgadillo DPM

## 2024-04-11 NOTE — NURSING NOTE
Dispensed 1 Pneumatic Walking Brace, Size medium, with [facility] agreement signed by patient. Ledy Esteban CMA, April 11, 2024

## 2024-04-22 ENCOUNTER — OFFICE VISIT (OUTPATIENT)
Dept: PODIATRY | Facility: CLINIC | Age: 39
End: 2024-04-22
Payer: COMMERCIAL

## 2024-04-22 ENCOUNTER — ANCILLARY PROCEDURE (OUTPATIENT)
Dept: GENERAL RADIOLOGY | Facility: CLINIC | Age: 39
End: 2024-04-22
Attending: PODIATRIST
Payer: COMMERCIAL

## 2024-04-22 VITALS
DIASTOLIC BLOOD PRESSURE: 70 MMHG | WEIGHT: 196 LBS | HEIGHT: 69 IN | BODY MASS INDEX: 29.03 KG/M2 | SYSTOLIC BLOOD PRESSURE: 112 MMHG

## 2024-04-22 DIAGNOSIS — S82.831A CLOSED FRACTURE OF DISTAL END OF RIGHT FIBULA, UNSPECIFIED FRACTURE MORPHOLOGY, INITIAL ENCOUNTER: Primary | ICD-10-CM

## 2024-04-22 DIAGNOSIS — S82.831A CLOSED FRACTURE OF DISTAL END OF RIGHT FIBULA, UNSPECIFIED FRACTURE MORPHOLOGY, INITIAL ENCOUNTER: ICD-10-CM

## 2024-04-22 PROCEDURE — 99207 PR FRACTURE CARE IN GLOBAL PERIOD: CPT | Performed by: PODIATRIST

## 2024-04-22 PROCEDURE — 73610 X-RAY EXAM OF ANKLE: CPT | Mod: TC | Performed by: RADIOLOGY

## 2024-04-22 ASSESSMENT — PAIN SCALES - GENERAL: PAINLEVEL: MILD PAIN (2)

## 2024-04-22 NOTE — LETTER
4/22/2024         RE: Marv Armendariz  [id number]  [facility] [address]        Dear Colleague,    Thank you for referring your patient, Marv Armendariz, to the [facility]. Please see a copy of my visit note below.    Chief Complaint   Patient presents with     RECHECK     (2w1d) NWB w/tall gray fx boot, Right distal fibula fx, DOI 4/7/2024; XR R ankle 4/22/2024; LOV 4/11/2024     Other     Presents with his wife 4/22/2024     HPI:  Mavr Armendariz is a 39 year old male who is seen in consultation at the request of ED DEPT - Bensen Haile Conteh MD    Pt presents for eval of:   (Onset, Location, L/R, Character, Treatments, Injury if yes)    XR Right ankle 4/7/2024     Onset 4/7/2024, [place] walking [place] [address] middle of night, slipped on grass and fell down into drainage ditch.  He was unable to get out of the drainage ditch because it was too deep and slippery.  Unable to ambulate.  Phone got wet and was unable to make calls but kept dialing 911 until the police pinged his phone and found his location.      Presents with anterior Right ankle pain, NWB w/splint and crutches. Accompanied by his wife.  Constant swelling, bruising, dull ache. Intermittent burning pain 6-9/10, with certain ROM or if bumped.    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    Since last visit he notices less swelling less pain and he stayed in the fracture boot and has been nonweightbearing.    ROS:  10 point ROS neg other than the symptoms noted above in the HPI.    There is no problem list on file for this patient.      PAST MEDICAL HISTORY: History reviewed. No pertinent past medical history.     PAST SURGICAL HISTORY: History reviewed. No pertinent surgical history.     MEDICATIONS:   Current Outpatient Medications:      Acetaminophen (TYLENOL PO), , Disp: , Rfl:      IBUPROFEN PO, , Disp: , Rfl:      ondansetron (ZOFRAN ODT) 4 MG  "ODT tab, Take 1 tablet (4 mg) by mouth every 8 hours as needed for nausea (Patient not taking: Reported on 4/11/2024), Disp: 10 tablet, Rfl: 0     ALLERGIES:  No Known Allergies     SOCIAL HISTORY:   Social History     Socioeconomic History     Marital status:      Spouse name: Not on file     Number of children: Not on file     Years of education: Not on file     Highest education level: Not on file   Occupational History     Not on file   Tobacco Use     Smoking status: Never     Smokeless tobacco: Never   Substance and Sexual Activity     Alcohol use: Not Currently     Drug use: Never     Sexual activity: Not on file   Other Topics Concern     Not on file   Social History Narrative     Not on file     Social Determinants of Health     Financial Resource Strain: Not on file   Food Insecurity: Not on file   Transportation Needs: Not on file   Physical Activity: Not on file   Stress: Not on file   Social Connections: Not on file   Interpersonal Safety: Not on file   Housing Stability: Not on file        FAMILY HISTORY: History reviewed. No pertinent family history.     EXAM:Vitals: /70 (BP Location: Left arm, Patient Position: Sitting, Cuff Size: Adult Large)   Ht 1.753 m (5' 9\")   Wt 88.9 kg (196 lb)   BMI 28.94 kg/m    BMI= Body mass index is 28.94 kg/m .    General appearance: Patient is alert and fully cooperative with history & exam.  No sign of distress is noted during the visit.     Psychiatric: Affect is pleasant & appropriate.  Patient appears motivated to improve health.     Respiratory: Breathing is regular & unlabored while sitting.     HEENT: Hearing is intact to spoken word.  Speech is clear.  No gross evidence of visual impairment that would impact ambulation.     Vascular: DP & PT pulses are intact & regular bilaterally.  No significant edema or varicosities noted.  CFT and skin temperature is normal to both lower extremities.     Neurologic: Lower extremity sensation is intact to " light touch.  No evidence of weakness or contracture in the lower extremities.  No evidence of neuropathy.    Dermatologic: Skin is intact to both lower extremities with adequate texture, turgor and tone about the integument.  No paronychia or evidence of soft tissue infection is noted.     Musculoskeletal: Patient is ambulatory with posterior splint and crutches.  Much reduced discomfort and edema circumferentially around the right ankle.  Ecchymosis is resolved and edema is very soft and diminished.  No erythema or heat.  Still discomfort with firm palpation about the lateral malleolus of the right distal fibula.    Radiographs: 3 views of the right ankle demonstrate oblique fracture of the distal fibula but it is quite short.  No displacement is noted and best visualized on the lateral.  Minimal to no shortening is noted.    Radiographs: 3 views of the right ankle/22/24 demonstrate no change significant change in alignment about the distal short transverse to oblique fracture of the distal fibula.  This appears to start distal to the joint line.     ASSESSMENT:       ICD-10-CM    1. Closed fracture of distal end of right fibula, unspecified fracture morphology, initial encounter  S82.831A XR Ankle Right G/E 3 Views             PLAN:  Reviewed patient's chart in [facility].      4/11/2024   Minimal to no shortening and no lateral displacement therefore discussed options of ORIF versus immobilization at this time he would like to continue with immobilization.  Compression dressing was applied and he was placed in the fracture boot and will remain nonweightbearing.  Follow-up in 1-2 weeks to repeat imaging ordered future today.  We will confirm no change in alignment is noted and then likely begin short periods of protected weightbearing in a fracture boot.  Keep the fracture boot in place 24/7 even sleep and rest..  Keep the compression in place during the daytime.    4/22/2024  Obtained and interpreted radiographs  demonstrating no significant change in alignment.  Recommend he stay in the fracture boot even during sleep and rest  Continue compression during the day  Regular bathing  May begin weightbearing in the fracture boot for short distances and transfers but not long distances  Follow-up again in 2 weeks to repeat imaging and may progress activities and therapy as needed at that time.      Hank Delgadillo DPM              Again, thank you for allowing me to participate in the care of your patient.        Sincerely,        Hank Delgadillo DPM

## 2024-04-22 NOTE — PROGRESS NOTES
Chief Complaint   Patient presents with    RECHECK     (2w1d) NWB w/tall gray fx boot, Right distal fibula fx, DOI 4/7/2024; XR R ankle 4/22/2024; LOV 4/11/2024    Other     Presents with his wife 4/22/2024     HPI:  Marv Armendariz is a 39 year old male who is seen in consultation at the request of ED DEPT - Bensen Haile Conteh MD    Pt presents for eval of:   (Onset, Location, L/R, Character, Treatments, Injury if yes)    XR Right ankle 4/7/2024     Onset 4/7/2024, while walking [place] the [address] middle of night, slipped on grass and fell down into drainage ditch.  He was unable to get out of the drainage ditch because it was too deep and slippery.  Unable to ambulate.  Phone got wet and was unable to make calls but kept dialing 911 until the police pinged his phone and found his location.      Presents with anterior Right ankle pain, NWB w/splint and crutches. Accompanied by his wife.  Constant swelling, bruising, dull ache. Intermittent burning pain 6-9/10, with certain ROM or if bumped.    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    Since last visit he notices less swelling less pain and he stayed in the fracture boot and has been nonweightbearing.    ROS:  10 point ROS neg other than the symptoms noted above in the HPI.    There is no problem list on file for this patient.      PAST MEDICAL HISTORY: History reviewed. No pertinent past medical history.     PAST SURGICAL HISTORY: History reviewed. No pertinent surgical history.     MEDICATIONS:   Current Outpatient Medications:     Acetaminophen (TYLENOL PO), , Disp: , Rfl:     IBUPROFEN PO, , Disp: , Rfl:     ondansetron (ZOFRAN ODT) 4 MG ODT tab, Take 1 tablet (4 mg) by mouth every 8 hours as needed for nausea (Patient not taking: Reported on 4/11/2024), Disp: 10 tablet, Rfl: 0     ALLERGIES:  No Known Allergies     SOCIAL HISTORY:   Social History     Socioeconomic History    Marital status:  "     Spouse name: Not on file    Number of children: Not on file    Years of education: Not on file    Highest education level: Not on file   Occupational History    Not on file   Tobacco Use    Smoking status: Never    Smokeless tobacco: Never   Substance and Sexual Activity    Alcohol use: Not Currently    Drug use: Never    Sexual activity: Not on file   Other Topics Concern    Not on file   Social History Narrative    Not on file     Social Determinants of Health     Financial Resource Strain: Not on file   Food Insecurity: Not on file   Transportation Needs: Not on file   Physical Activity: Not on file   Stress: Not on file   Social Connections: Not on file   Interpersonal Safety: Not on file   Housing Stability: Not on file        FAMILY HISTORY: History reviewed. No pertinent family history.     EXAM:Vitals: /70 (BP Location: Left arm, Patient Position: Sitting, Cuff Size: Adult Large)   Ht 1.753 m (5' 9\")   Wt 88.9 kg (196 lb)   BMI 28.94 kg/m    BMI= Body mass index is 28.94 kg/m .    General appearance: Patient is alert and fully cooperative with history & exam.  No sign of distress is noted during the visit.     Psychiatric: Affect is pleasant & appropriate.  Patient appears motivated to improve health.     Respiratory: Breathing is regular & unlabored while sitting.     HEENT: Hearing is intact to spoken word.  Speech is clear.  No gross evidence of visual impairment that would impact ambulation.     Vascular: DP & PT pulses are intact & regular bilaterally.  No significant edema or varicosities noted.  CFT and skin temperature is normal to both lower extremities.     Neurologic: Lower extremity sensation is intact to light touch.  No evidence of weakness or contracture in the lower extremities.  No evidence of neuropathy.    Dermatologic: Skin is intact to both lower extremities with adequate texture, turgor and tone about the integument.  No paronychia or evidence of soft tissue " infection is noted.     Musculoskeletal: Patient is ambulatory with posterior splint and crutches.  Much reduced discomfort and edema circumferentially around the right ankle.  Ecchymosis is resolved and edema is very soft and diminished.  No erythema or heat.  Still discomfort with firm palpation about the lateral malleolus of the right distal fibula.    Radiographs: 3 views of the right ankle demonstrate oblique fracture of the distal fibula but it is quite short.  No displacement is noted and best visualized on the lateral.  Minimal to no shortening is noted.    Radiographs: 3 views of the right ankle/22/24 demonstrate no change significant change in alignment about the distal short transverse to oblique fracture of the distal fibula.  This appears to start distal to the joint line.     ASSESSMENT:       ICD-10-CM    1. Closed fracture of distal end of right fibula, unspecified fracture morphology, initial encounter  S82.831A XR Ankle Right G/E 3 Views             PLAN:  Reviewed patient's chart in [facility].      4/11/2024   Minimal to no shortening and no lateral displacement therefore discussed options of ORIF versus immobilization at this time he would like to continue with immobilization.  Compression dressing was applied and he was placed in the fracture boot and will remain nonweightbearing.  Follow-up in 1-2 weeks to repeat imaging ordered future today.  We will confirm no change in alignment is noted and then likely begin short periods of protected weightbearing in a fracture boot.  Keep the fracture boot in place 24/7 even sleep and rest..  Keep the compression in place during the daytime.    4/22/2024  Obtained and interpreted radiographs demonstrating no significant change in alignment.  Recommend he stay in the fracture boot even during sleep and rest  Continue compression during the day  Regular bathing  May begin weightbearing in the fracture boot for short distances and transfers but not long  distances  Follow-up again in 2 weeks to repeat imaging and may progress activities and therapy as needed at that time.      Hank Delgadillo DPM

## 2024-05-16 ENCOUNTER — ANCILLARY PROCEDURE (OUTPATIENT)
Dept: GENERAL RADIOLOGY | Facility: CLINIC | Age: 39
End: 2024-05-16
Attending: PODIATRIST
Payer: COMMERCIAL

## 2024-05-16 ENCOUNTER — OFFICE VISIT (OUTPATIENT)
Dept: PODIATRY | Facility: CLINIC | Age: 39
End: 2024-05-16
Payer: COMMERCIAL

## 2024-05-16 VITALS
BODY MASS INDEX: 29.03 KG/M2 | HEIGHT: 69 IN | DIASTOLIC BLOOD PRESSURE: 78 MMHG | SYSTOLIC BLOOD PRESSURE: 120 MMHG | WEIGHT: 196 LBS

## 2024-05-16 DIAGNOSIS — S82.831A CLOSED FRACTURE OF DISTAL END OF RIGHT FIBULA, UNSPECIFIED FRACTURE MORPHOLOGY, INITIAL ENCOUNTER: ICD-10-CM

## 2024-05-16 DIAGNOSIS — S82.831A CLOSED FRACTURE OF DISTAL END OF RIGHT FIBULA, UNSPECIFIED FRACTURE MORPHOLOGY, INITIAL ENCOUNTER: Primary | ICD-10-CM

## 2024-05-16 PROCEDURE — 99207 PR FRACTURE CARE IN GLOBAL PERIOD: CPT | Performed by: PODIATRIST

## 2024-05-16 PROCEDURE — 73610 X-RAY EXAM OF ANKLE: CPT | Mod: TC | Performed by: RADIOLOGY

## 2024-05-16 ASSESSMENT — PAIN SCALES - GENERAL: PAINLEVEL: NO PAIN (0)

## 2024-05-16 NOTE — PROGRESS NOTES
Chief Complaint   Patient presents with    RECHECK     (5w4d) NWB w/tall gray fx boot, Right distal fibula fx, DOI 4/7/2024; XR R ankle 5/16/2024; LOV 4/22/2024    Other     Presents with his wife 5/16/2024     HPI:  Marv Armendariz is a 39 year old male who is seen in consultation at the request of ED DEPT - Bensen Haile Conteh MD    Pt presents for eval of:   (Onset, Location, L/R, Character, Treatments, Injury if yes)    XR Right ankle 4/7/2024     Onset 4/7/2024, while walking [place] [address] middle of night, slipped on grass and fell down into drainage ditch.  He was unable to get out of the drainage ditch because it was too deep and slippery.  Unable to ambulate.  Phone got wet and was unable to make calls but kept dialing 911 until the police pinged his phone and found his location.      Presents with anterior Right ankle pain, NWB w/splint and crutches. Accompanied by his wife.  Constant swelling, bruising, dull ache. Intermittent burning pain 6-9/10, with certain ROM or if bumped.    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    Since last visit he notices less swelling less pain and he stayed in the fracture boot and has been nonweightbearing.    ROS:  10 point ROS neg other than the symptoms noted above in the HPI.    There is no problem list on file for this patient.      PAST MEDICAL HISTORY: History reviewed. No pertinent past medical history.     PAST SURGICAL HISTORY: History reviewed. No pertinent surgical history.     MEDICATIONS:   Current Outpatient Medications:     Acetaminophen (TYLENOL PO), , Disp: , Rfl:     IBUPROFEN PO, , Disp: , Rfl:     ondansetron (ZOFRAN ODT) 4 MG ODT tab, Take 1 tablet (4 mg) by mouth every 8 hours as needed for nausea (Patient not taking: Reported on 4/11/2024), Disp: 10 tablet, Rfl: 0     ALLERGIES:  No Known Allergies     SOCIAL HISTORY:   Social History     Socioeconomic History    Marital status:  "     Spouse name: Not on file    Number of children: Not on file    Years of education: Not on file    Highest education level: Not on file   Occupational History    Not on file   Tobacco Use    Smoking status: Never    Smokeless tobacco: Never   Substance and Sexual Activity    Alcohol use: Not Currently    Drug use: Never    Sexual activity: Not on file   Other Topics Concern    Not on file   Social History Narrative    Not on file     Social Determinants of Health     Financial Resource Strain: Not on file   Food Insecurity: Not on file   Transportation Needs: Not on file   Physical Activity: Not on file   Stress: Not on file   Social Connections: Not on file   Interpersonal Safety: Not on file   Housing Stability: Not on file        FAMILY HISTORY: History reviewed. No pertinent family history.     EXAM:Vitals: /78 (BP Location: Left arm, Patient Position: Sitting, Cuff Size: Adult Large)   Ht 1.753 m (5' 9\")   Wt 88.9 kg (196 lb)   BMI 28.94 kg/m    BMI= Body mass index is 28.94 kg/m .    General appearance: Patient is alert and fully cooperative with history & exam.  No sign of distress is noted during the visit.     Psychiatric: Affect is pleasant & appropriate.  Patient appears motivated to improve health.     Respiratory: Breathing is regular & unlabored while sitting.     HEENT: Hearing is intact to spoken word.  Speech is clear.  No gross evidence of visual impairment that would impact ambulation.     Vascular: DP & PT pulses are intact & regular bilaterally.  No significant edema or varicosities noted.  CFT and skin temperature is normal to both lower extremities.     Neurologic: Lower extremity sensation is intact to light touch.  No evidence of weakness or contracture in the lower extremities.  No evidence of neuropathy.    Dermatologic: Skin is intact to both lower extremities with adequate texture, turgor and tone about the integument.  No paronychia or evidence of soft tissue " infection is noted.     Musculoskeletal: Patient is ambulatory with posterior splint and crutches.  Much reduced discomfort and edema circumferentially around the right ankle.  Ecchymosis is resolved and edema is very soft and diminished.  No erythema or heat.  Still discomfort with firm palpation about the lateral malleolus of the right distal fibula.    Radiographs: 3 views of the right ankle demonstrate oblique fracture of the distal fibula but it is quite short.  No displacement is noted and best visualized on the lateral.  Minimal to no shortening is noted.    Radiographs: 3 views of the right ankle 4/22/24 demonstrate no change significant change in alignment about the distal short transverse to oblique fracture of the distal fibula.  This appears to start distal to the joint line.     ASSESSMENT:       ICD-10-CM    1. Closed fracture of distal end of right fibula, unspecified fracture morphology, initial encounter  S82.831A Physical Therapy  Referral     Physical Therapy  Referral         PLAN:  Reviewed patient's chart in [facility].      4/11/2024   Minimal to no shortening and no lateral displacement therefore discussed options of ORIF versus immobilization at this time he would like to continue with immobilization.  Compression dressing was applied and he was placed in the fracture boot and will remain nonweightbearing.  Follow-up in 1-2 weeks to repeat imaging ordered future today.  We will confirm no change in alignment is noted and then likely begin short periods of protected weightbearing in a fracture boot.  Keep the fracture boot in place 24/7 even sleep and rest..  Keep the compression in place during the daytime.    4/22/2024  Obtained and interpreted radiographs demonstrating no significant change in alignment.  Recommend he stay in the fracture boot even during sleep and rest  Continue compression during the day  Regular bathing  May begin weightbearing in the fracture boot for short  distances and transfers but not long distances  Follow-up again in 2 weeks to repeat imaging and may progress activities and therapy as needed at that time.    5/16/2024  Obtained and interpreted radiographs today demonstrating appropriate interval healing of the fracture at the distal fibula.  Still some edema so begin physical therapy and progress as tolerated  Encourage weight bearing to tolerance in the fracture boot now and progress out of the fracture boot as tolerated  Encourage compression wrap or compression socks  Follow-up again in 3 weeks to confirm this progresses.      Hank Delgadillo DPM

## 2024-05-16 NOTE — LETTER
5/16/2024         RE: Marv Armendariz  [id number]  [facility] [address]        Dear Colleague,    Thank you for referring your patient, Marv Armendariz, to the [facility]. Please see a copy of my visit note below.    Chief Complaint   Patient presents with     RECHECK     (5w4d) NWB w/tall gray fx boot, Right distal fibula fx, DOI 4/7/2024; XR R ankle 5/16/2024; LOV 4/22/2024     Other     Presents with his wife 5/16/2024     HPI:  Marv Armendariz is a 39 year old male who is seen in consultation at the request of ED DEPT - Mckinleysen Haile Conteh MD    Pt presents for eval of:   (Onset, Location, L/R, Character, Treatments, Injury if yes)    XR Right ankle 4/7/2024     Onset 4/7/2024, [place] [address] middle of night, slipped on grass and fell down into drainage ditch.  He was unable to get out of the drainage ditch because it was too deep and slippery.  Unable to ambulate.  Phone got wet and was unable to make calls but kept dialing 911 until the police pinged his phone and found his location.      Presents with anterior Right ankle pain, NWB w/splint and crutches. Accompanied by his wife.  Constant swelling, bruising, dull ache. Intermittent burning pain 6-9/10, with certain ROM or if bumped.    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    Since last visit he notices less swelling less pain and he stayed in the fracture boot and has been nonweightbearing.    ROS:  10 point ROS neg other than the symptoms noted above in the HPI.    There is no problem list on file for this patient.      PAST MEDICAL HISTORY: History reviewed. No pertinent past medical history.     PAST SURGICAL HISTORY: History reviewed. No pertinent surgical history.     MEDICATIONS:   Current Outpatient Medications:      Acetaminophen (TYLENOL PO), , Disp: , Rfl:      IBUPROFEN PO, , Disp: , Rfl:      ondansetron (ZOFRAN ODT) 4 MG  "ODT tab, Take 1 tablet (4 mg) by mouth every 8 hours as needed for nausea (Patient not taking: Reported on 4/11/2024), Disp: 10 tablet, Rfl: 0     ALLERGIES:  No Known Allergies     SOCIAL HISTORY:   Social History     Socioeconomic History     Marital status:      Spouse name: Not on file     Number of children: Not on file     Years of education: Not on file     Highest education level: Not on file   Occupational History     Not on file   Tobacco Use     Smoking status: Never     Smokeless tobacco: Never   Substance and Sexual Activity     Alcohol use: Not Currently     Drug use: Never     Sexual activity: Not on file   Other Topics Concern     Not on file   Social History Narrative     Not on file     Social Determinants of Health     Financial Resource Strain: Not on file   Food Insecurity: Not on file   Transportation Needs: Not on file   Physical Activity: Not on file   Stress: Not on file   Social Connections: Not on file   Interpersonal Safety: Not on file   Housing Stability: Not on file        FAMILY HISTORY: History reviewed. No pertinent family history.     EXAM:Vitals: /78 (BP Location: Left arm, Patient Position: Sitting, Cuff Size: Adult Large)   Ht 1.753 m (5' 9\")   Wt 88.9 kg (196 lb)   BMI 28.94 kg/m    BMI= Body mass index is 28.94 kg/m .    General appearance: Patient is alert and fully cooperative with history & exam.  No sign of distress is noted during the visit.     Psychiatric: Affect is pleasant & appropriate.  Patient appears motivated to improve health.     Respiratory: Breathing is regular & unlabored while sitting.     HEENT: Hearing is intact to spoken word.  Speech is clear.  No gross evidence of visual impairment that would impact ambulation.     Vascular: DP & PT pulses are intact & regular bilaterally.  No significant edema or varicosities noted.  CFT and skin temperature is normal to both lower extremities.     Neurologic: Lower extremity sensation is intact to " light touch.  No evidence of weakness or contracture in the lower extremities.  No evidence of neuropathy.    Dermatologic: Skin is intact to both lower extremities with adequate texture, turgor and tone about the integument.  No paronychia or evidence of soft tissue infection is noted.     Musculoskeletal: Patient is ambulatory with posterior splint and crutches.  Much reduced discomfort and edema circumferentially around the right ankle.  Ecchymosis is resolved and edema is very soft and diminished.  No erythema or heat.  Still discomfort with firm palpation about the lateral malleolus of the right distal fibula.    Radiographs: 3 views of the right ankle demonstrate oblique fracture of the distal fibula but it is quite short.  No displacement is noted and best visualized on the lateral.  Minimal to no shortening is noted.    Radiographs: 3 views of the right ankle 4/22/24 demonstrate no change significant change in alignment about the distal short transverse to oblique fracture of the distal fibula.  This appears to start distal to the joint line.     ASSESSMENT:       ICD-10-CM    1. Closed fracture of distal end of right fibula, unspecified fracture morphology, initial encounter  S82.831A Physical Therapy  Referral     Physical Therapy  Referral         PLAN:  Reviewed patient's chart in [facility].      4/11/2024   Minimal to no shortening and no lateral displacement therefore discussed options of ORIF versus immobilization at this time he would like to continue with immobilization.  Compression dressing was applied and he was placed in the fracture boot and will remain nonweightbearing.  Follow-up in 1-2 weeks to repeat imaging ordered future today.  We will confirm no change in alignment is noted and then likely begin short periods of protected weightbearing in a fracture boot.  Keep the fracture boot in place 24/7 even sleep and rest..  Keep the compression in place during the  daytime.    4/22/2024  Obtained and interpreted radiographs demonstrating no significant change in alignment.  Recommend he stay in the fracture boot even during sleep and rest  Continue compression during the day  Regular bathing  May begin weightbearing in the fracture boot for short distances and transfers but not long distances  Follow-up again in 2 weeks to repeat imaging and may progress activities and therapy as needed at that time.    5/16/2024  Obtained and interpreted radiographs today demonstrating appropriate interval healing of the fracture at the distal fibula.  Still some edema so begin physical therapy and progress as tolerated  Encourage weight bearing to tolerance in the fracture boot now and progress out of the fracture boot as tolerated  Encourage compression wrap or compression socks  Follow-up again in 3 weeks to confirm this progresses.      Hank Delgadillo DPM              Again, thank you for allowing me to participate in the care of your patient.        Sincerely,        Hank Delgadillo DPM

## 2024-06-10 ENCOUNTER — OFFICE VISIT (OUTPATIENT)
Dept: PODIATRY | Facility: CLINIC | Age: 39
End: 2024-06-10
Payer: COMMERCIAL

## 2024-06-10 VITALS
DIASTOLIC BLOOD PRESSURE: 72 MMHG | SYSTOLIC BLOOD PRESSURE: 120 MMHG | HEIGHT: 69 IN | WEIGHT: 196 LBS | BODY MASS INDEX: 29.03 KG/M2

## 2024-06-10 DIAGNOSIS — S82.831A CLOSED FRACTURE OF DISTAL END OF RIGHT FIBULA, UNSPECIFIED FRACTURE MORPHOLOGY, INITIAL ENCOUNTER: Primary | ICD-10-CM

## 2024-06-10 PROCEDURE — 99207 PR FRACTURE CARE IN GLOBAL PERIOD: CPT | Performed by: PODIATRIST

## 2024-06-10 ASSESSMENT — PAIN SCALES - GENERAL: PAINLEVEL: NO PAIN (0)

## 2024-06-10 NOTE — PROGRESS NOTES
Chief Complaint   Patient presents with    RECHECK     (9w1d) WB w/tall gray fx boot, Right distal fibula fx, DOI 4/7/2024; XR R ankle 5/16/2024; LOV 5/16/2024    Other     Presents with his wife 6/10/2024     HPI:  Marv Armendariz is a 39 year old male who is seen in consultation at the request of ED DEPT - Malik Conteh MD    Pt presents for eval of:   (Onset, Location, L/R, Character, Treatments, Injury if yes)    XR Right ankle 4/7/2024     Onset 4/7/2024, [place] walking [place] [address] middle of night, slipped on grass and fell down into drainage ditch.  He was unable to get out of the drainage ditch because it was too deep and slippery.  Unable to ambulate.  Phone got wet and was unable to make calls but kept dialing 911 until the police pinged his phone and found his location.      Presents with anterior Right ankle pain, NWB w/splint and crutches. Accompanied by his wife.  Constant swelling, bruising, dull ache. Intermittent burning pain 6-9/10, with certain ROM or if bumped.    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    Since last visit he notes that he had to reschedule his physical therapy for some reason.  He presents today carrying his crutches and wearing a fracture boot.  He denies pain but notes about the same amount of swelling as last time.    ROS:  10 point ROS neg other than the symptoms noted above in the HPI.    There is no problem list on file for this patient.      PAST MEDICAL HISTORY: History reviewed. No pertinent past medical history.     PAST SURGICAL HISTORY: History reviewed. No pertinent surgical history.     MEDICATIONS:   Current Outpatient Medications:     Acetaminophen (TYLENOL PO), , Disp: , Rfl:     IBUPROFEN PO, , Disp: , Rfl:     ondansetron (ZOFRAN ODT) 4 MG ODT tab, Take 1 tablet (4 mg) by mouth every 8 hours as needed for nausea (Patient not taking: Reported on 4/11/2024), Disp: 10 tablet, Rfl: 0    "  ALLERGIES:  No Known Allergies     SOCIAL HISTORY:   Social History     Socioeconomic History    Marital status:      Spouse name: Not on file    Number of children: Not on file    Years of education: Not on file    Highest education level: Not on file   Occupational History    Not on file   Tobacco Use    Smoking status: Never    Smokeless tobacco: Never   Substance and Sexual Activity    Alcohol use: Not Currently    Drug use: Never    Sexual activity: Not on file   Other Topics Concern    Not on file   Social History Narrative    Not on file     Social Determinants of Health     Financial Resource Strain: Not on file   Food Insecurity: Not on file   Transportation Needs: Not on file   Physical Activity: Not on file   Stress: Not on file   Social Connections: Not on file   Interpersonal Safety: Not on file   Housing Stability: Not on file        FAMILY HISTORY: History reviewed. No pertinent family history.     EXAM:Vitals: /72 (BP Location: Left arm, Patient Position: Sitting, Cuff Size: Adult Large)   Ht 1.753 m (5' 9\")   Wt 88.9 kg (196 lb)   BMI 28.94 kg/m    BMI= Body mass index is 28.94 kg/m .    General appearance: Patient is alert and fully cooperative with history & exam.  No sign of distress is noted during the visit.     Psychiatric: Affect is pleasant & appropriate.  Patient appears motivated to improve health.     Respiratory: Breathing is regular & unlabored while sitting.     HEENT: Hearing is intact to spoken word.  Speech is clear.  No gross evidence of visual impairment that would impact ambulation.     Vascular: DP & PT pulses are intact & regular bilaterally.  No significant edema or varicosities noted.  CFT and skin temperature is normal to both lower extremities.     Neurologic: Lower extremity sensation is intact to light touch.  No evidence of weakness or contracture in the lower extremities.  No evidence of neuropathy.    Dermatologic: Skin is intact to both lower " extremities with adequate texture, turgor and tone about the integument.  No paronychia or evidence of soft tissue infection is noted.     Musculoskeletal: Patient is ambulatory with fracture boot and caring crutches.  There is still subtle induration circumferentially around the ankle and rear foot of the right ankle.    Radiographs: 3 views of the right ankle demonstrate oblique fracture of the distal fibula but it is quite short.  No displacement is noted and best visualized on the lateral.  Minimal to no shortening is noted.    Radiographs: 3 views of the right ankle 4/22/24 demonstrate no change significant change in alignment about the distal short transverse to oblique fracture of the distal fibula.  This appears to start distal to the joint line.    Radiographs: 5/16/2024 3 views of the right ankle demonstrate no change in alignment with appropriate interval healing.     ASSESSMENT:       ICD-10-CM    1. Closed fracture of distal end of right fibula, unspecified fracture morphology, initial encounter  S82.831A            PLAN:  Reviewed patient's chart in [facility].      4/11/2024   Minimal to no shortening and no lateral displacement therefore discussed options of ORIF versus immobilization at this time he would like to continue with immobilization.  Compression dressing was applied and he was placed in the fracture boot and will remain nonweightbearing.  Follow-up in 1-2 weeks to repeat imaging ordered future today.  We will confirm no change in alignment is noted and then likely begin short periods of protected weightbearing in a fracture boot.  Keep the fracture boot in place 24/7 even sleep and rest..  Keep the compression in place during the daytime.    4/22/2024  Obtained and interpreted radiographs demonstrating no significant change in alignment.  Recommend he stay in the fracture boot even during sleep and rest  Continue compression during the day  Regular bathing  May begin weightbearing in the  fracture boot for short distances and transfers but not long distances  Follow-up again in 2 weeks to repeat imaging and may progress activities and therapy as needed at that time.    5/16/2024  Obtained and interpreted radiographs today demonstrating appropriate interval healing of the fracture at the distal fibula.  Still some edema so begin physical therapy and progress as tolerated  Encourage weight bearing to tolerance in the fracture boot now and progress out of the fracture boot as tolerated  Encourage compression wrap or compression socks  Follow-up again in 3 weeks to confirm this progresses.    6/10/2024  No restriction on PT.  Get started as soon as possible and progress as tolerated  Encourage compression socks or sports brace for compression and stop the boot and protections as tolerated.   Follow-up again in 1 month.      Hank Delgadillo DPM
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
## 2024-06-10 NOTE — PATIENT INSTRUCTIONS
Sturdy and reliable ankle braces are most readily available on line, OncoPep, or Find[employer]ogi and delivered for around $15-60.  Health insurance often does not pay for this.    Consider:   Ease of application  Volume of the brace and will it fit in your shoes  Does the brace look like it will provide full circumference compression, which is needed if your ankle is swollen and less helpful for chronic deformities.     For recovery of an acute injury, avoid plastic stays on the side of the brace like the Aircast ankle stirrup as they are very bulky and do not fit in most shoes.  Avoid simple neoprene or compression sleeve only braces, as they do not provide much stability or resist re-injury during your recovery.    For acute or recent ankle injuries we often use an ankle brace for compression and stability and mostly to prevent minor re-injuries until the patient is able to regain strength and balance and able to perform one footed toe raise and one footed balance, equal bilateral.  Then discontinue its use as it can encourage the ankle to remain weak over many months.      For long term deformities and chronic instability the more rigid and thus bulkier braces are most often used to provide more help long term or help a ligament that has been elongated after multiple injuries.      Double ankle strap or support is a good choice for acute ankle sprains or for compression, low volume and moderate stability.  Procare is one brand.     Swedjon-O ankle brace   Procare lace up ankle brace - are both reasonable choices for long term support.  However they get a bit bulkier.  These are intended for longer term use.    ASO ankle brace.  They also consume the most volume in your shoes and may be harder to put on for some patients.

## 2024-06-18 ENCOUNTER — THERAPY VISIT (OUTPATIENT)
Dept: PHYSICAL THERAPY | Facility: CLINIC | Age: 39
End: 2024-06-18
Attending: PODIATRIST
Payer: COMMERCIAL

## 2024-06-18 DIAGNOSIS — S82.831A CLOSED FRACTURE OF DISTAL END OF RIGHT FIBULA, UNSPECIFIED FRACTURE MORPHOLOGY, INITIAL ENCOUNTER: Primary | ICD-10-CM

## 2024-06-18 PROCEDURE — 97161 PT EVAL LOW COMPLEX 20 MIN: CPT | Mod: GP | Performed by: PHYSICAL THERAPIST

## 2024-06-18 PROCEDURE — 97110 THERAPEUTIC EXERCISES: CPT | Mod: GP | Performed by: PHYSICAL THERAPIST

## 2024-06-18 PROCEDURE — 97140 MANUAL THERAPY 1/> REGIONS: CPT | Mod: GP | Performed by: PHYSICAL THERAPIST

## 2024-06-18 ASSESSMENT — ACTIVITIES OF DAILY LIVING (ADL)
ANY_OF_YOUR_USUAL_WORK,_HOUSEWORK_OR_SCHOOL_ACTIVITIES: QUITE A BIT OF DIFFICULTY
PLEASE_INDICATE_YOR_PRIMARY_REASON_FOR_REFERRAL_TO_THERAPY:: FOOT AND/OR ANKLE
LEFS_RAW_SCORE: INCOMPLETE
LEFS_SCORE(%): INCOMPLETE
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
## 2024-06-26 ENCOUNTER — THERAPY VISIT (OUTPATIENT)
Dept: PHYSICAL THERAPY | Facility: CLINIC | Age: 39
End: 2024-06-26
Attending: PODIATRIST
Payer: COMMERCIAL

## 2024-06-26 DIAGNOSIS — S82.831A CLOSED FRACTURE OF DISTAL END OF RIGHT FIBULA, UNSPECIFIED FRACTURE MORPHOLOGY, INITIAL ENCOUNTER: Primary | ICD-10-CM

## 2024-06-26 PROCEDURE — 97140 MANUAL THERAPY 1/> REGIONS: CPT | Mod: GP | Performed by: PHYSICAL THERAPIST

## 2024-06-26 PROCEDURE — 97112 NEUROMUSCULAR REEDUCATION: CPT | Mod: GP | Performed by: PHYSICAL THERAPIST

## 2024-06-26 PROCEDURE — 97110 THERAPEUTIC EXERCISES: CPT | Mod: GP | Performed by: PHYSICAL THERAPIST

## 2024-06-30 ENCOUNTER — HEALTH MAINTENANCE LETTER (OUTPATIENT)
Age: 39
End: 2024-06-30

## 2024-07-12 ENCOUNTER — THERAPY VISIT (OUTPATIENT)
Dept: PHYSICAL THERAPY | Facility: CLINIC | Age: 39
End: 2024-07-12
Attending: PODIATRIST
Payer: COMMERCIAL

## 2024-07-12 DIAGNOSIS — S82.831A CLOSED FRACTURE OF DISTAL END OF RIGHT FIBULA, UNSPECIFIED FRACTURE MORPHOLOGY, INITIAL ENCOUNTER: Primary | ICD-10-CM

## 2024-07-12 PROCEDURE — 97116 GAIT TRAINING THERAPY: CPT | Mod: GP | Performed by: PHYSICAL THERAPIST

## 2024-07-12 PROCEDURE — 97110 THERAPEUTIC EXERCISES: CPT | Mod: GP | Performed by: PHYSICAL THERAPIST

## 2024-07-12 PROCEDURE — 97112 NEUROMUSCULAR REEDUCATION: CPT | Mod: GP | Performed by: PHYSICAL THERAPIST

## 2024-07-25 ENCOUNTER — THERAPY VISIT (OUTPATIENT)
Dept: PHYSICAL THERAPY | Facility: CLINIC | Age: 39
End: 2024-07-25
Attending: PODIATRIST
Payer: COMMERCIAL

## 2024-07-25 DIAGNOSIS — S82.831A CLOSED FRACTURE OF DISTAL END OF RIGHT FIBULA, UNSPECIFIED FRACTURE MORPHOLOGY, INITIAL ENCOUNTER: Primary | ICD-10-CM

## 2024-07-25 PROCEDURE — 97112 NEUROMUSCULAR REEDUCATION: CPT | Mod: GP | Performed by: PHYSICAL THERAPIST

## 2024-07-25 PROCEDURE — 97110 THERAPEUTIC EXERCISES: CPT | Mod: GP | Performed by: PHYSICAL THERAPIST

## 2024-07-25 PROCEDURE — 97116 GAIT TRAINING THERAPY: CPT | Mod: GP | Performed by: PHYSICAL THERAPIST

## 2024-07-25 NOTE — PROGRESS NOTES
07/25/24 0500   Appointment Info   Signing clinician's name / credentials Charity Paez PT, DPT, ATC, LAT   Visits Used 4   Medical Diagnosis Closed fracture of distal end of right fibula, unspecified fracture morphology, initial encounter   PT Tx Diagnosis joint stiffness, muscle weakness, impaired gait, impaired balance   Precautions/Limitations none as of 6/10 MD visit   Progress Note/Certification   Onset of illness/injury or Date of Surgery 04/07/24   Therapy Frequency 2x/week   Predicted Duration 10 weeks   Progress Note Due Date 07/17/24   Progress Note Completed Date 06/18/24       Present No   GOALS   PT Goals 2;3;4   PT Goal 1   Goal Identifier AROM DF   Goal Description Patient will demonstrate no WB right ankle AROM DF to 10 degrees in order to achieve proper terminal stance range of motion.   Rationale to maximize safety and independence within the community;to maximize safety and independence within the home   Goal Progress 9* nearing goal range of motion, but not achieved this date, will extend time line to allow for attainment.    Target Date 07/16/24-->8/15/2024    PT Goal 2   Goal Identifier Strength - PF   Goal Description Patient will demonstrate improved right ankle PF strength as evidence by the ability to complete 15 single leg heel raises with minimal hand support without ankle pain in order to progress to higher strengthening.   Goal Progress bilat ankle PF without hand support with fair right ankle stability   Target Date 08/13/24   PT Goal 3   Goal Identifier Gait   Goal Description Patient will demonstrate ambulation for 6 minutes without assistive device, without pain and with proper ankle and foot rockers for improved quality of life.   Goal Progress no AD, using treadmill, improving heel and toe rocker. antalgic with short stance phase on RLE and decreased hip and knee flexion. 1/10 right ankle pain   Target Date 08/27/24   PT Goal 4   Goal Identifier Balance    Goal Description Patient will demonstrate RLE single limb stance of 30 seconds on solid ground with eyes open as evidence of decreased risk of falls.   Rationale to maximize safety and independence within the community;to maximize safety and independence within the home   Goal Progress 18 second with initial 7 seconds with several faults but no support needed to aquire stable balance for remaining time   Target Date 08/27/24   Subjective Report   Subjective Report Patient presents to PT this date with crocs (new with better support), no AD. He notes not wearing his combat boots, only the new crocs in the yard. He notes overall the ankle and foot are continuing to improve. Notes right ankle pain 1/10 with activity, globally with his ADLs notes less pain. Notes pain when waking in the am and when watching TV in the evening upon getting up. He notes poor HEP compliance due to not feeling in pain and focusing more on his functional routine. He does not have a follow up scheduled with Dr. Delgadillo  and was encouraged to scheduled the follow up as instructed at his last visit.   Objective Measures   Objective Measures Objective Measure 1;Objective Measure 2;Objective Measure 3   Objective Measure 1   Objective Measure Swelling- figure 4   Details 53.5 cm RLE   Objective Measure 2   Objective Measure R SLS   Details 18 second with initial 7 seconds with several faults but no support needed to aquire stable balance for remaining time   Objective Measure 3   Objective Measure ROM   Details 9-0-48*   Treatment Interventions (PT)   Interventions Therapeutic Procedure/Exercise;Neuromuscular Re-education;Gait Training   Therapeutic Procedure/Exercise   Therapeutic Procedures: strength, endurance, ROM, flexibility minutes (04073) 17   Ther Proc 1 - Details Seated BAPS level 3 with 2.5# weight at AM location x 15 PF-DF, x 15 CW and x 15 CCW. Standing heel raises x 15, x 15 with tennis ball at ankle. Step ups 6  step up no hand  support, x 5 LLE and 6  x 6, 4  x 10 RLE.   Skilled Intervention specific exercise, education and instruction based on impairements observed   Patient Response/Progress lateral ankle fatigue with heel raises. significant LOB with step ups on RLE due ot poor balance and weakness, improved with shorter step height   Neuromuscular Re-education   Neuromuscular re-ed of mvmt, balance, coord, kinesthetic sense, posture, proprioception minutes (06111) 10   Neuro Re-ed 1 - Details BOSU ball step up, up, down, down x 10 bilat. Standing on black side of BOSU anteirior to posterior balance taps x 10, AP x 10. SLS balance training 3 x 30 sec bilat, cues for 90/90/90 and core engagement   Skilled Intervention proprioceptive training to improve joint control and prevent falls   Patient Response/Progress tolerated increased challenge well, dynamic mobility improved. stability without shoes demonstrates greater intrinsic recruitment   Gait Training   Gait Training Minutes, includes stair climbing (55505) 15   Gait 1 - Details Treadmill incline 1.5, speed 1.3  without hands 6 minutes forward. Speed 1.0 Loaping high knee marching 2 minutes, butt kicks 2 minutes.  Speed .7 lateral walking 2 minutes bilat. Speed 1.0 retro walking 3 minutes.   Skilled Intervention dynamic gait training to improve RLE weight acceptance and gait mechanics to decrease compensatory strategies.   Patient Response/Progress decreased step length in RLE stance. improved ankle rocker and toe push off. mild RLE retroversion   Education   Learner/Method Patient;Demonstration;Pictures/Video;Listening   Education Comments HEP compliance   Plan   Home program nothing added   Plan for next session balance. WB strengthening   Comments   Comments Patient has been seen in outpatient physical therapy for 4 visits including initial evaluation. He has made significant improvement in his pain, activity tolerance, weight acceptance and gait mechanics over the right ankle and  foot. He admits to poor compliance with his home program and declines recommendations for sturdy shoes to support the ankle and foot following the fracture. He acquired new crocs and feels these have helped his pain. He has returned to most tasks at home inside and out without significant disability. This date he demonstrates progress towards all established goals, however remains with impaired balance, strength, flexibility and muscular endurance. He would benefit from continued outpatient physical therapy interventions to progress towards these goals for improved quality of life and return to desired level of function.   Total Session Time   Timed Code Treatment Minutes 42   Total Treatment Time (sum of timed and untimed services) 42         PLAN  Continue therapy per current plan of care.    Beginning/End Dates of Progress Note Reporting Period:  06/18/24 to 07/25/2024    Referring Provider:  Hank Delgadillo    Thank you for your referral.  Charity Paez, PT, DPT, ATC, LAT    [facility]  O: [phone]  E: [email or web address]

## 2024-07-30 ENCOUNTER — THERAPY VISIT (OUTPATIENT)
Dept: PHYSICAL THERAPY | Facility: CLINIC | Age: 39
End: 2024-07-30
Attending: PODIATRIST
Payer: COMMERCIAL

## 2024-07-30 DIAGNOSIS — S82.831A CLOSED FRACTURE OF DISTAL END OF RIGHT FIBULA, UNSPECIFIED FRACTURE MORPHOLOGY, INITIAL ENCOUNTER: Primary | ICD-10-CM

## 2024-07-30 PROCEDURE — 97110 THERAPEUTIC EXERCISES: CPT | Mod: GP | Performed by: PHYSICAL THERAPIST

## 2024-07-30 PROCEDURE — 97116 GAIT TRAINING THERAPY: CPT | Mod: GP | Performed by: PHYSICAL THERAPIST

## 2024-07-30 PROCEDURE — 97112 NEUROMUSCULAR REEDUCATION: CPT | Mod: GP | Performed by: PHYSICAL THERAPIST

## 2024-08-02 ENCOUNTER — THERAPY VISIT (OUTPATIENT)
Dept: PHYSICAL THERAPY | Facility: CLINIC | Age: 39
End: 2024-08-02
Attending: PODIATRIST
Payer: COMMERCIAL

## 2024-08-02 DIAGNOSIS — S82.831A CLOSED FRACTURE OF DISTAL END OF RIGHT FIBULA, UNSPECIFIED FRACTURE MORPHOLOGY, INITIAL ENCOUNTER: Primary | ICD-10-CM

## 2024-08-02 PROCEDURE — 97112 NEUROMUSCULAR REEDUCATION: CPT | Mod: GP | Performed by: PHYSICAL THERAPIST

## 2024-08-02 PROCEDURE — 97116 GAIT TRAINING THERAPY: CPT | Mod: GP | Performed by: PHYSICAL THERAPIST

## 2024-08-02 PROCEDURE — 97110 THERAPEUTIC EXERCISES: CPT | Mod: GP | Performed by: PHYSICAL THERAPIST

## 2024-08-02 NOTE — PROGRESS NOTES
DISCHARGE  Reason for Discharge: Patient chooses to discontinue therapy.    Patient has not been seen in physical therapy for greater than 30 days.   Patient's episode of care is discharged and patient will require additional physical therapy evaluation to resume cares pertaining to treating diagnosis.      Equipment Issued:    Discharge Plan: Patient to continue home program.    Referring Provider:  Hank Delgadillo    Thank you for your referral.  Charity Paez, PT, DPT, ATC, LAT    [facility]  O: [phone]  E: [email or web address]       08/02/24 0500   Appointment Info   Signing clinician's name / credentials Charity Paez PT, DPT, ATC, LAT   Visits Used 6   Medical Diagnosis Closed fracture of distal end of right fibula, unspecified fracture morphology, initial encounter   PT Tx Diagnosis joint stiffness, muscle weakness, impaired gait, impaired balance   Precautions/Limitations none as of 6/10 MD visit   Progress Note/Certification   Onset of illness/injury or Date of Surgery 04/07/24   Therapy Frequency 2x/week   Predicted Duration 10 weeks   Progress Note Due Date 08/22/24   Progress Note Completed Date 07/25/24       Present No   GOALS   PT Goals 2;3;4   PT Goal 1   Goal Identifier AROM DF   Goal Description Patient will demonstrate no WB right ankle AROM DF to 10 degrees in order to achieve proper terminal stance range of motion.   Rationale to maximize safety and independence within the community;to maximize safety and independence within the home   Goal Progress 10* DF   Target Date 08/15/24   Date Met 08/02/24   PT Goal 2   Goal Identifier Strength - PF   Goal Description Patient will demonstrate improved right ankle PF strength as evidence by the ability to complete 15 single leg heel raises with minimal hand support without ankle pain in order to progress to higher strengthening.   Goal Progress 30 bilat heel raises with counter support PRN.  unable to complete any single limb heel raises.   Target Date 08/13/24   PT Goal 3   Goal Identifier Gait   Goal Description Patient will demonstrate ambulation for 6 minutes without assistive device, without pain and with proper ankle and foot rockers for improved quality of life.   Goal Progress walking 15 minutes on the treadmill with decreased RLE ankle and midfoot rocker, shortened step length and globally antalgic, no UE support with all drills this date. remains with no report of pain.   Target Date 08/27/24   PT Goal 4   Goal Identifier Balance   Goal Description Patient will demonstrate RLE single limb stance of 30 seconds on solid ground with eyes open as evidence of decreased risk of falls.   Rationale to maximize safety and independence within the community;to maximize safety and independence within the home   Goal Progress 45 seconds   Target Date 08/27/24   Date Met 08/02/24   Subjective Report   Subjective Report Patient reports to PT this date with increased ankle pain for the last two days. He denies any mis-steps that hurt the ankle. He does report carrying 80# of water softener salt down his stairs around the time the pain increased. Notes worse pain upon rising from resting, the pain lasts roughly 10 minutes with slow improvement. He notes pushing through the pain and continuing to move helps decrease the duration. Notes most soreness in the morning upon waking. Pain is not waking patient at night.   Objective Measures   Objective Measures Objective Measure 1;Objective Measure 2;Objective Measure 3   Objective Measure 1   Objective Measure Swelling- figure 4   Details 52.5 cm   Objective Measure 2   Objective Measure R SLS   Details 45 seconds without pain   Objective Measure 3   Objective Measure ROM   Details 10*-0-51*   Treatment Interventions (PT)   Interventions Therapeutic Procedure/Exercise;Neuromuscular Re-education;Gait Training   Therapeutic Procedure/Exercise   Therapeutic Procedures:  strength, endurance, ROM, flexibility minutes (78351) 15   Ther Proc 1 - Details Incline gastroc stretch 2 x30 sec, soleus stretch 2 x 30 sec bilat. Heel raises with tennis ball squeeze x 30 in // bars but no hand support. Decline squats x 10 bilat limb support, incline squats x 10 bilat limb support.  Lateral step up and over 4  x 10 bilat no hand support. 4  step up with LUE 90/90/90 balance lift x 15 RLE. Unable to complete single heel raise   Skilled Intervention specific exercise, education and instruction based on impairements observed   Patient Response/Progress improving weight shift and limb WB tolerance. remains with soft tissue tension in the calf but overall improving. Significant RLE PF weakness as evidence by inability to complete heel raise   Neuromuscular Re-education   Neuromuscular re-ed of mvmt, balance, coord, kinesthetic sense, posture, proprioception minutes (37064) 15   Neuro Re-ed 1 - Details At elevated plinth surface SLS bilaterally 3 x 30 seconds with 90/90/90 limb position, 1 set crocs on, 2 sets without shoes. Theraband board AP weight shifts tilt board control x 15 bilat LE support, ML weight shfits on tilt board x 15 no UE support, bilat Les. Theraband board single limb AP tilts x 8, ML titls x 8. UE support PRN. RLE SLS 45 seconds without significant fault   Skilled Intervention proprioceptive training to improve joint control and prevent falls   Patient Response/Progress tolerating well, no UE support required for stability. no pain reported and adequate challenge felt   Gait Training   Gait Training Minutes, includes stair climbing (14224) 15   Gait 1 - Details Treadmill incline 1.5, speed 1.5 without hands, 7 minutes forward, VC for long stepps, equal step length and heel to toe pattern. Speed 1.0 marching 2 minutes, butt kicks 1 minute, lateral stepping bilaterally 2 minutes each, retro walking 2 minutes.   Skilled Intervention dynamic gait training to improve RLE weight  acceptance and gait mechanics to decrease compensatory strategies.   Patient Response/Progress crocs on. decreased stance phase on RLE. Able to tolerate increased speed of treadmill without worsened pain. Noted hip and shin soreness.   Education   Learner/Method Patient;Demonstration;Pictures/Video;Listening   Education Comments HEP compliance   Plan   Home program nothing added, increase compliance   Plan for next session balance. RLE SL heel raise progression. dynamic walking drills - ladder stepping. 4 square step test   Comments   Comments Patient has been seen in outpatient physical therapy for 6 total visits. Patient demonstrates significant improvement in strength and balance since previous progress note. He is making progress towards established goals, however remains with antalgic gait and impaired ankle strength. Anticipate with improved compliance at home with his HEP he will make good progress towards the established goals. Patient at this time with no future scheduled appointments and was encouraged to continue forward at once a week for 4 additional weeks in order to progress towards established goals and patient's desired quality of life.   Total Session Time   Timed Code Treatment Minutes 45   Total Treatment Time (sum of timed and untimed services) 45        08/02/24 0500   Appointment Info   Signing clinician's name / credentials Charity Paez PT, DPT, ATC, LAT   Visits Used 6   Medical Diagnosis Closed fracture of distal end of right fibula, unspecified fracture morphology, initial encounter   PT Tx Diagnosis joint stiffness, muscle weakness, impaired gait, impaired balance   Precautions/Limitations none as of 6/10 MD visit   Progress Note/Certification   Onset of illness/injury or Date of Surgery 04/07/24   Therapy Frequency 2x/week   Predicted Duration 10 weeks   Progress Note Due Date 08/22/24   Progress Note Completed Date 07/25/24       Present No   GOALS   PT Goals  2;3;4   PT Goal 1   Goal Identifier AROM DF   Goal Description Patient will demonstrate no WB right ankle AROM DF to 10 degrees in order to achieve proper terminal stance range of motion.   Rationale to maximize safety and independence within the community;to maximize safety and independence within the home   Goal Progress 10* DF   Target Date 08/15/24   Date Met 08/02/24   PT Goal 2   Goal Identifier Strength - PF   Goal Description Patient will demonstrate improved right ankle PF strength as evidence by the ability to complete 15 single leg heel raises with minimal hand support without ankle pain in order to progress to higher strengthening.   Goal Progress 30 bilat heel raises with counter support PRN. unable to complete any single limb heel raises.   Target Date 08/13/24   PT Goal 3   Goal Identifier Gait   Goal Description Patient will demonstrate ambulation for 6 minutes without assistive device, without pain and with proper ankle and foot rockers for improved quality of life.   Goal Progress walking 15 minutes on the treadmill with decreased RLE ankle and midfoot rocker, shortened step length and globally antalgic, no UE support with all drills this date. remains with no report of pain.   Target Date 08/27/24   PT Goal 4   Goal Identifier Balance   Goal Description Patient will demonstrate RLE single limb stance of 30 seconds on solid ground with eyes open as evidence of decreased risk of falls.   Rationale to maximize safety and independence within the community;to maximize safety and independence within the home   Goal Progress 45 seconds   Target Date 08/27/24   Date Met 08/02/24   Subjective Report   Subjective Report Patient reports to PT this date with increased ankle pain for the last two days. He denies any mis-steps that hurt the ankle. He does report carrying 80# of water softener salt down his stairs around the time the pain increased. Notes worse pain upon rising from resting, the pain lasts  roughly 10 minutes with slow improvement. He notes pushing through the pain and continuing to move helps decrease the duration. Notes most soreness in the morning upon waking. Pain is not waking patient at night.   Objective Measures   Objective Measures Objective Measure 1;Objective Measure 2;Objective Measure 3   Objective Measure 1   Objective Measure Swelling- figure 4   Details 52.5 cm   Objective Measure 2   Objective Measure R SLS   Details 45 seconds without pain   Objective Measure 3   Objective Measure ROM   Details 10*-0-51*   Treatment Interventions (PT)   Interventions Therapeutic Procedure/Exercise;Neuromuscular Re-education;Gait Training   Therapeutic Procedure/Exercise   Therapeutic Procedures: strength, endurance, ROM, flexibility minutes (98739) 15   Ther Proc 1 - Details Incline gastroc stretch 2 x30 sec, soleus stretch 2 x 30 sec bilat. Heel raises with tennis ball squeeze x 30 in // bars but no hand support. Decline squats x 10 bilat limb support, incline squats x 10 bilat limb support.  Lateral step up and over 4  x 10 bilat no hand support. 4  step up with LUE 90/90/90 balance lift x 15 RLE. Unable to complete single heel raise   Skilled Intervention specific exercise, education and instruction based on impairements observed   Patient Response/Progress improving weight shift and limb WB tolerance. remains with soft tissue tension in the calf but overall improving. Significant RLE PF weakness as evidence by inability to complete heel raise   Neuromuscular Re-education   Neuromuscular re-ed of mvmt, balance, coord, kinesthetic sense, posture, proprioception minutes (02480) 15   Neuro Re-ed 1 - Details At elevated plinth surface SLS bilaterally 3 x 30 seconds with 90/90/90 limb position, 1 set crocs on, 2 sets without shoes. Theraband board AP weight shifts tilt board control x 15 bilat LE support, ML weight shfits on tilt board x 15 no UE support, bilat Les. Theraband board single limb AP tilts  x 8, ML titls x 8. UE support PRN. RLE SLS 45 seconds without significant fault   Skilled Intervention proprioceptive training to improve joint control and prevent falls   Patient Response/Progress tolerating well, no UE support required for stability. no pain reported and adequate challenge felt   Gait Training   Gait Training Minutes, includes stair climbing (71165) 15   Gait 1 - Details Treadmill incline 1.5, speed 1.5 without hands, 7 minutes forward, VC for long stepps, equal step length and heel to toe pattern. Speed 1.0 marching 2 minutes, butt kicks 1 minute, lateral stepping bilaterally 2 minutes each, retro walking 2 minutes.   Skilled Intervention dynamic gait training to improve RLE weight acceptance and gait mechanics to decrease compensatory strategies.   Patient Response/Progress crocs on. decreased stance phase on RLE. Able to tolerate increased speed of treadmill without worsened pain. Noted hip and shin soreness.   Education   Learner/Method Patient;Demonstration;Pictures/Video;Listening   Education Comments HEP compliance   Plan   Home program nothing added, increase compliance   Plan for next session balance. RLE SL heel raise progression. dynamic walking drills - ladder stepping. 4 square step test   Comments   Comments Patient has been seen in outpatient physical therapy for 6 total visits. Patient demonstrates significant improvement in strength and balance since previous progress note. He is making progress towards established goals, however remains with antalgic gait and impaired ankle strength. Anticipate with improved compliance at home with his HEP he will make good progress towards the established goals. Patient at this time with no future scheduled appointments and was encouraged to continue forward at once a week for 4 additional weeks in order to progress towards established goals and patient's desired quality of life.   Total Session Time   Timed Code Treatment Minutes 45   Total  Treatment Time (sum of timed and untimed services) 45           PLAN  Continue therapy per current plan of care.    Beginning/End Dates of Progress Note Reporting Period:  07/25/24 to 08/02/2024    Referring Provider:  Hank Delgadillo    Thank you for your referral.  Charity Paez, PT, DPT, ATC, [facility]  O: [phone]  E: [email or web address]

## 2025-01-30 ENCOUNTER — OFFICE VISIT (OUTPATIENT)
Dept: FAMILY MEDICINE | Facility: OTHER | Age: 40
End: 2025-01-30

## 2025-01-30 VITALS
SYSTOLIC BLOOD PRESSURE: 130 MMHG | BODY MASS INDEX: 31.23 KG/M2 | RESPIRATION RATE: 16 BRPM | OXYGEN SATURATION: 98 % | DIASTOLIC BLOOD PRESSURE: 92 MMHG | HEART RATE: 81 BPM | WEIGHT: 199 LBS | HEIGHT: 67 IN | TEMPERATURE: 97.3 F

## 2025-01-30 DIAGNOSIS — K62.5 RECTAL BLEEDING: ICD-10-CM

## 2025-01-30 DIAGNOSIS — R03.0 ELEVATED BLOOD PRESSURE READING WITHOUT DIAGNOSIS OF HYPERTENSION: ICD-10-CM

## 2025-01-30 DIAGNOSIS — K21.9 GASTROESOPHAGEAL REFLUX DISEASE WITHOUT ESOPHAGITIS: ICD-10-CM

## 2025-01-30 DIAGNOSIS — Z00.00 ROUTINE GENERAL MEDICAL EXAMINATION AT A HEALTH CARE FACILITY: Primary | ICD-10-CM

## 2025-01-30 DIAGNOSIS — K76.9 LIVER LESION: ICD-10-CM

## 2025-01-30 PROBLEM — S82.831A CLOSED FRACTURE OF DISTAL END OF RIGHT FIBULA, UNSPECIFIED FRACTURE MORPHOLOGY, INITIAL ENCOUNTER: Status: RESOLVED | Noted: 2024-06-18 | Resolved: 2025-01-30

## 2025-01-30 SDOH — HEALTH STABILITY: PHYSICAL HEALTH: ON AVERAGE, HOW MANY DAYS PER WEEK DO YOU ENGAGE IN MODERATE TO STRENUOUS EXERCISE (LIKE A BRISK WALK)?: 1 DAY

## 2025-01-30 ASSESSMENT — SOCIAL DETERMINANTS OF HEALTH (SDOH): HOW OFTEN DO YOU GET TOGETHER WITH FRIENDS OR RELATIVES?: ONCE A WEEK

## 2025-01-30 ASSESSMENT — PAIN SCALES - GENERAL: PAINLEVEL_OUTOF10: NO PAIN (0)

## 2025-01-30 NOTE — PROGRESS NOTES
Preventive Care Visit  [facility]  Sybil Flaherty MD, Family Medicine  Jan 30, 2025      Assessment & Plan     Routine general medical examination at a health care facility    Gastroesophageal reflux disease without esophagitis  Recommend continued Prilosec OTC as needed.  He states 1 package will last him about 6 months.  We discussed elevating the head of his bed, avoiding acidic foods, not lying down after eating, not eating within a couple hours of bedtime.  If he develops dysphagia or uncontrolled heartburn even with the medication would then consider EGD.    Liver lesion  Patient CT he had a couple liver lesions thought to be hemangiomas but not definite.  MRI of the liver was recommended.  Patient does not have insurance.  We discussed that this could be spent day but he would like to talk about this with his wife and thinks he will probably go ahead and have the liver for MRI done.  We did discuss if they are hemangiomas and that this would be considered benign and no further evaluation would be needed.  However if they are not hemangiomas he may need further evaluation.  - MR Liver wo & w Contrast; Future    Rectal bleeding  He states he has had intermittent rectal bleeding for the last 15 years.  He notes it mainly when he is straining.  He does not notice hemorrhoids but wonders if he does have bleeding hemorrhoids.  He denies pain with defecation.  The blood is bright red and on his stool.  Sometimes he notes it with wiping.  It is not consistent.  We discussed etiology could range from anal fissure, hemorrhoids, diverticulosis versus other lesion.  Did discuss that it is recommended with persistent rectal bleeding to have colonoscopy done.  At this time he would not like to pursue this but will let me know if he changes his mind.    Elevated blood-pressure reading without diagnosis of hypertension  Patient is not seeing very regularly.  However when he was being seen for  "his fracture his blood pressure was normal.  We discussed that his blood pressure is slightly elevated today.  We discussed healthy diet, decreasing salt, increasing exercise and weight loss.  Recommend that he get a home blood pressure monitor.  If his blood pressure is consistently elevated after lifestyle changes he will return and we will discuss further evaluation and management.      BMI  Estimated body mass index is 31.05 kg/m  as calculated from the following:    Height as of this encounter: 1.705 m (5' 7.13\").    Weight as of this encounter: 90.3 kg (199 lb).   Weight management plan: Discussed healthy diet and exercise guidelines    Counseling  Appropriate preventive services were addressed with this patient via screening, questionnaire, or discussion as appropriate for fall prevention, nutrition, physical activity, Tobacco-use cessation, social engagement, weight loss and cognition.  Checklist reviewing preventive services available has been given to the patient.  Reviewed patient's diet, addressing concerns and/or questions.   He is at risk for lack of exercise and has been provided with information to increase physical activity for the benefit of his well-being.   The patient was instructed to see the dentist every 6 months.       Rosa Fair is a 39 year old, presenting for the following:  Physical    HPI   When he had kidney stones, they found a lesion on liver    Also heartburn, has taken OTC heartburn medication-Prilosec in the past. Not currently taking anything. Worse with tomatoes, in the evening and when lying down.  TUMS doesn't help.    Would also like to discuss Cancer screenings, family history of cancer.  Mother has a history of liver cancer for which she states she had part of her liver removed and is followed at [facility].  He does not know any further knowledge.  States she also had thyroid cancer 30 years ago.    Allergies--usually in the wintertime and thought to be related to " cats    Health Care Directive  Patient does not have a Health Care Directive: Discussed advance care planning with patient; information given to patient to review.      1/30/2025   General Health   How would you rate your overall physical health? (!) FAIR   Feel stress (tense, anxious, or unable to sleep) Not at all         1/30/2025   Nutrition   Three or more servings of calcium each day? (!) I DON'T KNOW   Diet: Regular (no restrictions)   How many servings of fruit and vegetables per day? (!) 0-1   How many sweetened beverages each day? 0-1         1/30/2025   Exercise   Days per week of moderate/strenous exercise 1 day   (!) EXERCISE CONCERN      1/30/2025   Social Factors   Frequency of gathering with friends or relatives Once a week   Worry food won't last until get money to buy more No   Food not last or not have enough money for food? No   Do you have housing? (Housing is defined as stable permanent housing and does not include staying ouside in a car, in a tent, in an abandoned building, in an overnight shelter, or couch-surfing.) Yes   Are you worried about losing your housing? No   Lack of transportation? No   Unable to get utilities (heat,electricity)? No         1/30/2025   Dental   Dentist two times every year? (!) NO         1/30/2025   TB Screening   Were you born outside of the [place]? No         Today's PHQ-2 Score:       1/30/2025     1:28 PM   PHQ-2 ( 1999 [employer])   Q1: Little interest or pleasure in doing things 0   Q2: Feeling down, depressed or hopeless 0   PHQ-2 Score 0    Q1: Little interest or pleasure in doing things Not at all   Q2: Feeling down, depressed or hopeless Not at all   PHQ-2 Score 0       Patient-reported           1/30/2025   Substance Use   Alcohol more than 3/day or more than 7/wk No   Do you use any other substances recreationally? No     Social History     Tobacco Use    Smoking status: Never    Smokeless tobacco: Never   Vaping Use    Vaping status: Never Used   Substance  "Use Topics    Alcohol use: Not Currently    Drug use: Never             1/30/2025   One time HIV Screening   Previous HIV test? I don't know         1/30/2025   STI Screening   New sexual partner(s) since last STI/HIV test? No         1/30/2025   Contraception/Family Planning   Questions about contraception or family planning No        Reviewed and updated as needed this visit by Provider   Tobacco  Allergies  Meds  Problems  Med Hx  Surg Hx  Fam Hx                 Objective    Exam  BP (!) 130/92   Pulse 81   Temp 97.3  F (36.3  C) (Temporal)   Resp 16   Ht 1.705 m (5' 7.13\")   Wt 90.3 kg (199 lb)   SpO2 98%   BMI 31.05 kg/m     Estimated body mass index is 31.05 kg/m  as calculated from the following:    Height as of this encounter: 1.705 m (5' 7.13\").    Weight as of this encounter: 90.3 kg (199 lb).    Physical Exam  Vitals reviewed.   Constitutional:       General: He is not in acute distress.  HENT:      Right Ear: Tympanic membrane, ear canal and external ear normal.      Left Ear: Tympanic membrane, ear canal and external ear normal.      Nose: Nose normal.   Eyes:      General:         Right eye: No discharge.         Left eye: No discharge.      Conjunctiva/sclera: Conjunctivae normal.   Cardiovascular:      Rate and Rhythm: Normal rate and regular rhythm.      Heart sounds: No murmur heard.  Pulmonary:      Effort: Pulmonary effort is normal.      Breath sounds: Normal breath sounds. No wheezing or rhonchi.   Abdominal:      General: Bowel sounds are normal. There is no distension.      Palpations: Abdomen is soft. There is no mass.      Tenderness: There is no abdominal tenderness.   Musculoskeletal:         General: Normal range of motion.      Cervical back: Normal range of motion and neck supple. No tenderness.   Skin:     Findings: No rash.   Neurological:      Mental Status: He is alert and oriented to person, place, and time.   Psychiatric:         Mood and Affect: Mood normal.        "  Behavior: Behavior normal.         Signed Electronically by: Sybil Flaherty MD

## 2025-01-30 NOTE — PATIENT INSTRUCTIONS
Patient Education   Preventive Care Advice   This is general advice given by our system to help you stay healthy. However, your care team may have specific advice just for you. Please talk to your care team about your preventive care needs.  Nutrition  Eat 5 or more servings of fruits and vegetables each day.  Try wheat bread, brown rice and whole grain pasta (instead of white bread, rice, and pasta).  Get enough calcium and vitamin D. Check the label on foods and aim for 100% of the RDA (recommended daily allowance).  Lifestyle  Exercise at least 150 minutes each week  (30 minutes a day, 5 days a week).  Do muscle strengthening activities 2 days a week. These help control your weight and prevent disease.  No smoking.  Wear sunscreen to prevent skin cancer.  Have a dental exam and cleaning every 6 months.  Yearly exams  See your health care team every year to talk about:  Any changes in your health.  Any medicines your care team has prescribed.  Preventive care, family planning, and ways to prevent chronic diseases.  Shots (vaccines)   HPV shots (up to age 26), if you've never had them before.  Hepatitis B shots (up to age 59), if you've never had them before.  COVID-19 shot: Get this shot when it's due.  Flu shot: Get a flu shot every year.  Tetanus shot: Get a tetanus shot every 10 years.  Pneumococcal, hepatitis A, and RSV shots: Ask your care team if you need these based on your risk.  Shingles shot (for age 50 and up)  General health tests  Diabetes screening:  Starting at age 35, Get screened for diabetes at least every 3 years.  If you are younger than age 35, ask your care team if you should be screened for diabetes.  Cholesterol test: At age 39, start having a cholesterol test every 5 years, or more often if advised.  Bone density scan (DEXA): At age 50, ask your care team if you should have this scan for osteoporosis (brittle bones).  Hepatitis C: Get tested at least once in your life.  STIs (sexually  transmitted infections)  Before age 24: Ask your care team if you should be screened for STIs.  After age 24: Get screened for STIs if you're at risk. You are at risk for STIs (including HIV) if:  You are sexually active with more than one person.  You don't use condoms every time.  You or a partner was diagnosed with a sexually transmitted infection.  If you are at risk for HIV, ask about PrEP medicine to prevent HIV.  Get tested for HIV at least once in your life, whether you are at risk for HIV or not.  Cancer screening tests  Cervical cancer screening: If you have a cervix, begin getting regular cervical cancer screening tests starting at age 21.  Breast cancer scan (mammogram): If you've ever had breasts, begin having regular mammograms starting at age 40. This is a scan to check for breast cancer.  Colon cancer screening: It is important to start screening for colon cancer at age 45.  Have a colonoscopy test every 10 years (or more often if you're at risk) Or, ask your provider about stool tests like a FIT test every year or Cologuard test every 3 years.  To learn more about your testing options, visit:   .  For help making a decision, visit:   https://bit.ly/bz01955.  Prostate cancer screening test: If you have a prostate, ask your care team if a prostate cancer screening test (PSA) at age 55 is right for you.  Lung cancer screening: If you are a current or former smoker ages 50 to 80, ask your care team if ongoing lung cancer screenings are right for you.  For informational purposes only. Not to replace the advice of your health care provider. Copyright   2023 [employer]. All rights reserved. Clinically reviewed by the [facility] Transitions Program. [employer] [id number] - REV 01/24.

## 2025-03-05 ENCOUNTER — HOSPITAL ENCOUNTER (OUTPATIENT)
Dept: MRI IMAGING | Facility: CLINIC | Age: 40
Discharge: HOME | End: 2025-03-05
Attending: FAMILY MEDICINE
Payer: COMMERCIAL

## 2025-03-05 DIAGNOSIS — K76.9 LIVER LESION: ICD-10-CM

## 2025-03-05 PROCEDURE — 74183 MRI ABD W/O CNTR FLWD CNTR: CPT

## 2025-03-05 PROCEDURE — A9585 GADOBUTROL INJECTION: HCPCS | Performed by: FAMILY MEDICINE

## 2025-03-05 PROCEDURE — 255N000002 HC RX 255 OP 636: Performed by: FAMILY MEDICINE

## 2025-03-05 RX ORDER — GADOBUTROL 604.72 MG/ML
9 INJECTION INTRAVENOUS ONCE
Status: COMPLETED | OUTPATIENT
Start: 2025-03-05 | End: 2025-03-05

## 2025-03-05 RX ADMIN — GADOBUTROL 9 ML: 604.72 INJECTION INTRAVENOUS at 11:48

## 2025-03-10 ENCOUNTER — TELEPHONE (OUTPATIENT)
Dept: FAMILY MEDICINE | Facility: OTHER | Age: 40
End: 2025-03-10
Payer: COMMERCIAL

## 2025-03-10 NOTE — TELEPHONE ENCOUNTER
Test Results    Contacts       Contact Date/Time Type Contact Phone/Fax    03/10/2025 10:52 AM CDT Phone (Incoming) Marv Armendariz (Self) [phone] ()            Who ordered the test:  Dr Flaherty    Type of test: Lab and MRI    Date of test:  3/5/25    Where was the test performed:  [facility]    What are your questions/concerns?:  Results    Could we send this information to you in [employer] or would you prefer to receive a phone call?:   Patient would prefer a phone call   Okay to leave a detailed message?: Yes at Cell number on file:    Telephone Information:   Mobile [phone]

## 2025-03-10 NOTE — TELEPHONE ENCOUNTER
RN relayed results of imaging to patient.     Patient would like a copy of MRI from 3/5 printed and mailed to home address.     MARLO Morales, RN